# Patient Record
Sex: FEMALE | Race: WHITE | Employment: OTHER | URBAN - NONMETROPOLITAN AREA
[De-identification: names, ages, dates, MRNs, and addresses within clinical notes are randomized per-mention and may not be internally consistent; named-entity substitution may affect disease eponyms.]

---

## 2018-02-09 ENCOUNTER — HOSPITAL ENCOUNTER (EMERGENCY)
Age: 33
Discharge: HOME | End: 2018-02-09
Payer: COMMERCIAL

## 2018-02-09 VITALS
HEIGHT: 65 IN | BODY MASS INDEX: 21.66 KG/M2 | HEART RATE: 78 BPM | SYSTOLIC BLOOD PRESSURE: 118 MMHG | TEMPERATURE: 97.9 F | OXYGEN SATURATION: 100 % | DIASTOLIC BLOOD PRESSURE: 74 MMHG | WEIGHT: 130 LBS | RESPIRATION RATE: 16 BRPM

## 2018-02-09 DIAGNOSIS — J10.1 INFLUENZA A: Primary | ICD-10-CM

## 2018-02-09 LAB
FLU A ANTIGEN: POSITIVE
FLU B ANTIGEN: NEGATIVE
GROUP A STREP CULTURE, REFLEX: NEGATIVE
REFLEX THROAT C + S: NORMAL

## 2018-02-09 PROCEDURE — 87804 INFLUENZA ASSAY W/OPTIC: CPT

## 2018-02-09 PROCEDURE — 87880 STREP A ASSAY W/OPTIC: CPT

## 2018-02-09 PROCEDURE — 99214 OFFICE O/P EST MOD 30 MIN: CPT

## 2018-02-09 PROCEDURE — 99202 OFFICE O/P NEW SF 15 MIN: CPT | Performed by: NURSE PRACTITIONER

## 2018-02-09 PROCEDURE — 87070 CULTURE OTHR SPECIMN AEROBIC: CPT

## 2018-02-09 ASSESSMENT — ENCOUNTER SYMPTOMS
RHINORRHEA: 0
SINUS PRESSURE: 0
NAUSEA: 0
DIARRHEA: 0
VOMITING: 0
ABDOMINAL PAIN: 0
SORE THROAT: 1
SHORTNESS OF BREATH: 0
CHEST TIGHTNESS: 0

## 2018-02-09 ASSESSMENT — PAIN SCALES - GENERAL: PAINLEVEL_OUTOF10: 4

## 2018-02-09 ASSESSMENT — PAIN DESCRIPTION - DESCRIPTORS: DESCRIPTORS: BURNING

## 2018-02-09 ASSESSMENT — PAIN DESCRIPTION - PROGRESSION: CLINICAL_PROGRESSION: NOT CHANGED

## 2018-02-09 ASSESSMENT — PAIN DESCRIPTION - ONSET: ONSET: ON-GOING

## 2018-02-09 ASSESSMENT — PAIN DESCRIPTION - LOCATION: LOCATION: THROAT

## 2018-02-09 ASSESSMENT — PAIN DESCRIPTION - PAIN TYPE: TYPE: ACUTE PAIN

## 2018-02-09 ASSESSMENT — PAIN DESCRIPTION - FREQUENCY: FREQUENCY: CONTINUOUS

## 2018-02-09 NOTE — ED PROVIDER NOTES
Neurological: She is alert and oriented to person, place, and time. Skin: Skin is warm and dry. No rash (on exposed surfaces) noted. She is not diaphoretic. Psychiatric: She has a normal mood and affect. Her speech is normal.   Nursing note and vitals reviewed. DIAGNOSTIC RESULTS   Labs:  Results for orders placed or performed during the hospital encounter of 02/09/18   Rapid influenza A/B antigens   Result Value Ref Range    Flu A Antigen POSITIVE (A) NEGATIVE    Flu B Antigen NEGATIVE NEGATIVE   Group A Strep, Reflex   Result Value Ref Range    GROUP A STREP CULTURE, REFLEX NEGATIVE     REFLEX THROAT C + S INDICATED        IMAGING:  No orders to display     URGENT CARE COURSE:     Vitals:    02/09/18 1034   BP: 118/74   Pulse: 78   Resp: 16   Temp: 97.9 °F (36.6 °C)   TempSrc: Oral   SpO2: 100%   Weight: 130 lb (59 kg)   Height: 5' 5\" (1.651 m)       Medications - No data to display  PROCEDURES:  None  FINAL IMPRESSION      1. Influenza A        DISPOSITION/PLAN   DISPOSITION    Discharge  Testing results reviewed with patient. She reports that she is actively trying to conceive. Tamiflu was contraindicated for this reason. Medications as directed. Differential diagnosis(s) discussed with patient/patient representative. Home care/self care instructions reviewed with patient/patient representative. Patient is to follow-up with family care provider in 2-3 days if no improvement. Patient is to go to the emergency department if symptoms worsen. Patient/patient representative is aware of care plan, questions answered, verbalizes understanding and is in agreement. Teach back method used for patient/patient representative teaching(s) and printed instructions attached to after visit summary.     PATIENT REFERRED TO:  Arianna Vo NP  [address] [facility], [address] [address]  [address] [address] [address]    Schedule an appointment as soon as possible for a visit in 1 week  for further evalation, As needed, If

## 2018-02-11 LAB — THROAT/NOSE CULTURE: NORMAL

## 2019-03-01 ENCOUNTER — PREP FOR PROCEDURE (OUTPATIENT)
Dept: OBGYN | Age: 34
End: 2019-03-01

## 2019-03-01 ENCOUNTER — HOSPITAL ENCOUNTER (OUTPATIENT)
Age: 34
Discharge: HOME | End: 2019-03-01
Payer: COMMERCIAL

## 2019-03-01 LAB
ABO: NORMAL
BASOPHILS # BLD: 0.4 %
BASOPHILS ABSOLUTE: 0 THOU/MM3 (ref 0–0.1)
EOSINOPHIL # BLD: 0.4 %
EOSINOPHILS ABSOLUTE: 0 THOU/MM3 (ref 0–0.4)
ERYTHROCYTE [DISTWIDTH] IN BLOOD BY AUTOMATED COUNT: 13.2 % (ref 11.5–14.5)
ERYTHROCYTE [DISTWIDTH] IN BLOOD BY AUTOMATED COUNT: 43.8 FL (ref 35–45)
HCT VFR BLD CALC: 41.8 % (ref 37–47)
HEMOGLOBIN: 13.5 GM/DL (ref 12–16)
IMMATURE GRANS (ABS): 0.03 THOU/MM3 (ref 0–0.07)
IMMATURE GRANULOCYTES: 0.4 %
LYMPHOCYTES # BLD: 17.1 %
LYMPHOCYTES ABSOLUTE: 1.1 THOU/MM3 (ref 1–4.8)
MCH RBC QN AUTO: 29.3 PG (ref 26–33)
MCHC RBC AUTO-ENTMCNC: 32.3 GM/DL (ref 32.2–35.5)
MCV RBC AUTO: 90.7 FL (ref 81–99)
MONOCYTES # BLD: 7.4 %
MONOCYTES ABSOLUTE: 0.5 THOU/MM3 (ref 0.4–1.3)
NUCLEATED RED BLOOD CELLS: 0 /100 WBC
PLATELET # BLD: 212 THOU/MM3 (ref 130–400)
PMV BLD AUTO: 11.1 FL (ref 9.4–12.4)
RBC # BLD: 4.61 MILL/MM3 (ref 4.2–5.4)
RH FACTOR: NORMAL
SEG NEUTROPHILS: 74.3 %
SEGMENTED NEUTROPHILS ABSOLUTE COUNT: 5 THOU/MM3 (ref 1.8–7.7)
WBC # BLD: 6.7 THOU/MM3 (ref 4.8–10.8)

## 2019-03-01 PROCEDURE — 86901 BLOOD TYPING SEROLOGIC RH(D): CPT

## 2019-03-01 PROCEDURE — 86900 BLOOD TYPING SEROLOGIC ABO: CPT

## 2019-03-01 PROCEDURE — 36415 COLL VENOUS BLD VENIPUNCTURE: CPT

## 2019-03-01 PROCEDURE — 85025 COMPLETE CBC W/AUTO DIFF WBC: CPT

## 2019-03-01 RX ORDER — DOXYCYCLINE HYCLATE 100 MG
100 TABLET ORAL ONCE
Status: CANCELLED | OUTPATIENT
Start: 2019-03-01 | End: 2019-03-01

## 2019-03-01 RX ORDER — SODIUM CHLORIDE, SODIUM LACTATE, POTASSIUM CHLORIDE, CALCIUM CHLORIDE 600; 310; 30; 20 MG/100ML; MG/100ML; MG/100ML; MG/100ML
INJECTION, SOLUTION INTRAVENOUS CONTINUOUS
Status: CANCELLED | OUTPATIENT
Start: 2019-03-01

## 2019-03-01 RX ORDER — KETOROLAC TROMETHAMINE 30 MG/ML
30 INJECTION, SOLUTION INTRAMUSCULAR; INTRAVENOUS ONCE
Status: CANCELLED | OUTPATIENT
Start: 2019-03-01 | End: 2019-03-01

## 2019-03-01 RX ORDER — ONDANSETRON 2 MG/ML
8 INJECTION INTRAMUSCULAR; INTRAVENOUS EVERY 8 HOURS PRN
Status: CANCELLED | OUTPATIENT
Start: 2019-03-01

## 2019-03-04 ENCOUNTER — ANESTHESIA EVENT (OUTPATIENT)
Dept: OPERATING ROOM | Age: 34
End: 2019-03-04
Payer: COMMERCIAL

## 2019-03-04 ENCOUNTER — ANESTHESIA (OUTPATIENT)
Dept: OPERATING ROOM | Age: 34
End: 2019-03-04
Payer: COMMERCIAL

## 2019-03-04 ENCOUNTER — HOSPITAL ENCOUNTER (OUTPATIENT)
Age: 34
Setting detail: OUTPATIENT SURGERY
Discharge: HOME | End: 2019-03-04
Attending: OBSTETRICS & GYNECOLOGY | Admitting: OBSTETRICS & GYNECOLOGY
Payer: COMMERCIAL

## 2019-03-04 VITALS
DIASTOLIC BLOOD PRESSURE: 70 MMHG | OXYGEN SATURATION: 99 % | SYSTOLIC BLOOD PRESSURE: 107 MMHG | RESPIRATION RATE: 3 BRPM | TEMPERATURE: 98.6 F

## 2019-03-04 VITALS
HEIGHT: 65 IN | TEMPERATURE: 97.4 F | RESPIRATION RATE: 12 BRPM | HEART RATE: 61 BPM | BODY MASS INDEX: 22.33 KG/M2 | OXYGEN SATURATION: 98 % | DIASTOLIC BLOOD PRESSURE: 73 MMHG | SYSTOLIC BLOOD PRESSURE: 106 MMHG | WEIGHT: 134 LBS

## 2019-03-04 DIAGNOSIS — O02.1 MISSED AB: Primary | ICD-10-CM

## 2019-03-04 PROCEDURE — 6360000002 HC RX W HCPCS: Performed by: REGISTERED NURSE

## 2019-03-04 PROCEDURE — 88305 TISSUE EXAM BY PATHOLOGIST: CPT

## 2019-03-04 PROCEDURE — 3700000001 HC ADD 15 MINUTES (ANESTHESIA): Performed by: OBSTETRICS & GYNECOLOGY

## 2019-03-04 PROCEDURE — 2580000003 HC RX 258: Performed by: REGISTERED NURSE

## 2019-03-04 PROCEDURE — 7100000000 HC PACU RECOVERY - FIRST 15 MIN: Performed by: OBSTETRICS & GYNECOLOGY

## 2019-03-04 PROCEDURE — 3600000003 HC SURGERY LEVEL 3 BASE: Performed by: OBSTETRICS & GYNECOLOGY

## 2019-03-04 PROCEDURE — 7100000010 HC PHASE II RECOVERY - FIRST 15 MIN: Performed by: OBSTETRICS & GYNECOLOGY

## 2019-03-04 PROCEDURE — 7100000001 HC PACU RECOVERY - ADDTL 15 MIN: Performed by: OBSTETRICS & GYNECOLOGY

## 2019-03-04 PROCEDURE — 7100000011 HC PHASE II RECOVERY - ADDTL 15 MIN: Performed by: OBSTETRICS & GYNECOLOGY

## 2019-03-04 PROCEDURE — 2709999900 HC NON-CHARGEABLE SUPPLY: Performed by: OBSTETRICS & GYNECOLOGY

## 2019-03-04 PROCEDURE — 3600000013 HC SURGERY LEVEL 3 ADDTL 15MIN: Performed by: OBSTETRICS & GYNECOLOGY

## 2019-03-04 PROCEDURE — 6370000000 HC RX 637 (ALT 250 FOR IP): Performed by: OBSTETRICS & GYNECOLOGY

## 2019-03-04 PROCEDURE — 3700000000 HC ANESTHESIA ATTENDED CARE: Performed by: OBSTETRICS & GYNECOLOGY

## 2019-03-04 RX ORDER — PROPOFOL 10 MG/ML
INJECTION, EMULSION INTRAVENOUS PRN
Status: DISCONTINUED | OUTPATIENT
Start: 2019-03-04 | End: 2019-03-04 | Stop reason: SDUPTHER

## 2019-03-04 RX ORDER — DIPHENHYDRAMINE HYDROCHLORIDE 50 MG/ML
12.5 INJECTION INTRAMUSCULAR; INTRAVENOUS
Status: DISCONTINUED | OUTPATIENT
Start: 2019-03-04 | End: 2019-03-04 | Stop reason: HOSPADM

## 2019-03-04 RX ORDER — ONDANSETRON 2 MG/ML
INJECTION INTRAMUSCULAR; INTRAVENOUS PRN
Status: DISCONTINUED | OUTPATIENT
Start: 2019-03-04 | End: 2019-03-04 | Stop reason: SDUPTHER

## 2019-03-04 RX ORDER — ONDANSETRON 2 MG/ML
8 INJECTION INTRAMUSCULAR; INTRAVENOUS EVERY 8 HOURS PRN
Status: CANCELLED | OUTPATIENT
Start: 2019-03-04

## 2019-03-04 RX ORDER — ONDANSETRON 2 MG/ML
8 INJECTION INTRAMUSCULAR; INTRAVENOUS EVERY 8 HOURS PRN
Status: DISCONTINUED | OUTPATIENT
Start: 2019-03-04 | End: 2019-03-04 | Stop reason: HOSPADM

## 2019-03-04 RX ORDER — SODIUM CHLORIDE, SODIUM LACTATE, POTASSIUM CHLORIDE, CALCIUM CHLORIDE 600; 310; 30; 20 MG/100ML; MG/100ML; MG/100ML; MG/100ML
INJECTION, SOLUTION INTRAVENOUS CONTINUOUS
Status: DISCONTINUED | OUTPATIENT
Start: 2019-03-04 | End: 2019-03-04 | Stop reason: HOSPADM

## 2019-03-04 RX ORDER — SODIUM CHLORIDE, SODIUM LACTATE, POTASSIUM CHLORIDE, CALCIUM CHLORIDE 600; 310; 30; 20 MG/100ML; MG/100ML; MG/100ML; MG/100ML
INJECTION, SOLUTION INTRAVENOUS SEE ADMIN INSTRUCTIONS
Status: CANCELLED | OUTPATIENT
Start: 2019-03-04

## 2019-03-04 RX ORDER — HYDROCODONE BITARTRATE AND ACETAMINOPHEN 5; 325 MG/1; MG/1
1 TABLET ORAL EVERY 4 HOURS PRN
Status: CANCELLED | OUTPATIENT
Start: 2019-03-04

## 2019-03-04 RX ORDER — DOXYCYCLINE HYCLATE 100 MG
200 TABLET ORAL ONCE
Status: CANCELLED | OUTPATIENT
Start: 2019-03-04 | End: 2019-03-04

## 2019-03-04 RX ORDER — DIPHENHYDRAMINE HYDROCHLORIDE 50 MG/ML
INJECTION INTRAMUSCULAR; INTRAVENOUS PRN
Status: DISCONTINUED | OUTPATIENT
Start: 2019-03-04 | End: 2019-03-04 | Stop reason: SDUPTHER

## 2019-03-04 RX ORDER — FENTANYL CITRATE 50 UG/ML
25 INJECTION, SOLUTION INTRAMUSCULAR; INTRAVENOUS EVERY 5 MIN PRN
Status: DISCONTINUED | OUTPATIENT
Start: 2019-03-04 | End: 2019-03-04 | Stop reason: HOSPADM

## 2019-03-04 RX ORDER — HYDROCODONE BITARTRATE AND ACETAMINOPHEN 5; 325 MG/1; MG/1
2 TABLET ORAL EVERY 4 HOURS PRN
Status: CANCELLED | OUTPATIENT
Start: 2019-03-04

## 2019-03-04 RX ORDER — KETOROLAC TROMETHAMINE 30 MG/ML
30 INJECTION, SOLUTION INTRAMUSCULAR; INTRAVENOUS ONCE
Status: DISCONTINUED | OUTPATIENT
Start: 2019-03-04 | End: 2019-03-04 | Stop reason: HOSPADM

## 2019-03-04 RX ORDER — HYDROCODONE BITARTRATE AND ACETAMINOPHEN 5; 325 MG/1; MG/1
1 TABLET ORAL EVERY 6 HOURS PRN
Qty: 20 TABLET | Refills: 0 | Status: SHIPPED | OUTPATIENT
Start: 2019-03-04 | End: 2019-03-11

## 2019-03-04 RX ORDER — MEPERIDINE HYDROCHLORIDE 25 MG/ML
12.5 INJECTION INTRAMUSCULAR; INTRAVENOUS; SUBCUTANEOUS EVERY 5 MIN PRN
Status: DISCONTINUED | OUTPATIENT
Start: 2019-03-04 | End: 2019-03-04 | Stop reason: HOSPADM

## 2019-03-04 RX ORDER — DOCUSATE SODIUM 100 MG/1
100 CAPSULE, LIQUID FILLED ORAL 2 TIMES DAILY
Status: CANCELLED | OUTPATIENT
Start: 2019-03-04

## 2019-03-04 RX ORDER — DOXYCYCLINE HYCLATE 100 MG
100 TABLET ORAL ONCE
Status: COMPLETED | OUTPATIENT
Start: 2019-03-04 | End: 2019-03-04

## 2019-03-04 RX ORDER — MIDAZOLAM HYDROCHLORIDE 1 MG/ML
INJECTION INTRAMUSCULAR; INTRAVENOUS PRN
Status: DISCONTINUED | OUTPATIENT
Start: 2019-03-04 | End: 2019-03-04 | Stop reason: SDUPTHER

## 2019-03-04 RX ORDER — PROMETHAZINE HYDROCHLORIDE 25 MG/ML
25 INJECTION, SOLUTION INTRAMUSCULAR; INTRAVENOUS
Status: DISCONTINUED | OUTPATIENT
Start: 2019-03-04 | End: 2019-03-04 | Stop reason: HOSPADM

## 2019-03-04 RX ORDER — FENTANYL CITRATE 50 UG/ML
50 INJECTION, SOLUTION INTRAMUSCULAR; INTRAVENOUS EVERY 5 MIN PRN
Status: DISCONTINUED | OUTPATIENT
Start: 2019-03-04 | End: 2019-03-04 | Stop reason: HOSPADM

## 2019-03-04 RX ORDER — ACETAMINOPHEN 325 MG/1
650 TABLET ORAL EVERY 4 HOURS PRN
Status: CANCELLED | OUTPATIENT
Start: 2019-03-04

## 2019-03-04 RX ORDER — MORPHINE SULFATE 2 MG/ML
4 INJECTION, SOLUTION INTRAMUSCULAR; INTRAVENOUS
Status: CANCELLED | OUTPATIENT
Start: 2019-03-04

## 2019-03-04 RX ORDER — DEXAMETHASONE SODIUM PHOSPHATE 4 MG/ML
INJECTION, SOLUTION INTRA-ARTICULAR; INTRALESIONAL; INTRAMUSCULAR; INTRAVENOUS; SOFT TISSUE PRN
Status: DISCONTINUED | OUTPATIENT
Start: 2019-03-04 | End: 2019-03-04 | Stop reason: SDUPTHER

## 2019-03-04 RX ORDER — MORPHINE SULFATE 2 MG/ML
2 INJECTION, SOLUTION INTRAMUSCULAR; INTRAVENOUS
Status: CANCELLED | OUTPATIENT
Start: 2019-03-04

## 2019-03-04 RX ORDER — METHYLERGONOVINE MALEATE 0.2 MG/ML
INJECTION INTRAVENOUS PRN
Status: DISCONTINUED | OUTPATIENT
Start: 2019-03-04 | End: 2019-03-04 | Stop reason: SDUPTHER

## 2019-03-04 RX ORDER — KETOROLAC TROMETHAMINE 30 MG/ML
INJECTION, SOLUTION INTRAMUSCULAR; INTRAVENOUS PRN
Status: DISCONTINUED | OUTPATIENT
Start: 2019-03-04 | End: 2019-03-04 | Stop reason: SDUPTHER

## 2019-03-04 RX ORDER — FENTANYL CITRATE 50 UG/ML
INJECTION, SOLUTION INTRAMUSCULAR; INTRAVENOUS PRN
Status: DISCONTINUED | OUTPATIENT
Start: 2019-03-04 | End: 2019-03-04 | Stop reason: SDUPTHER

## 2019-03-04 RX ORDER — SODIUM CHLORIDE 9 MG/ML
INJECTION, SOLUTION INTRAVENOUS CONTINUOUS PRN
Status: DISCONTINUED | OUTPATIENT
Start: 2019-03-04 | End: 2019-03-04 | Stop reason: SDUPTHER

## 2019-03-04 RX ADMIN — DEXAMETHASONE SODIUM PHOSPHATE 4 MG: 4 INJECTION, SOLUTION INTRAMUSCULAR; INTRAVENOUS at 11:45

## 2019-03-04 RX ADMIN — KETOROLAC TROMETHAMINE 30 MG: 30 INJECTION, SOLUTION INTRAMUSCULAR; INTRAVENOUS at 11:54

## 2019-03-04 RX ADMIN — SODIUM CHLORIDE: 9 INJECTION, SOLUTION INTRAVENOUS at 11:40

## 2019-03-04 RX ADMIN — FENTANYL CITRATE 25 MCG: 50 INJECTION INTRAMUSCULAR; INTRAVENOUS at 11:48

## 2019-03-04 RX ADMIN — ONDANSETRON HYDROCHLORIDE 4 MG: 4 INJECTION, SOLUTION INTRAMUSCULAR; INTRAVENOUS at 11:45

## 2019-03-04 RX ADMIN — MIDAZOLAM HYDROCHLORIDE 2 MG: 1 INJECTION, SOLUTION INTRAMUSCULAR; INTRAVENOUS at 11:40

## 2019-03-04 RX ADMIN — METHYLERGONOVINE MALEATE 200 MCG: 0.2 INJECTION, SOLUTION INTRAMUSCULAR; INTRAVENOUS at 11:52

## 2019-03-04 RX ADMIN — DIPHENHYDRAMINE HYDROCHLORIDE 12.5 MG: 50 INJECTION, SOLUTION INTRAMUSCULAR; INTRAVENOUS at 11:45

## 2019-03-04 RX ADMIN — FENTANYL CITRATE 25 MCG: 50 INJECTION INTRAMUSCULAR; INTRAVENOUS at 11:50

## 2019-03-04 RX ADMIN — PROPOFOL 150 MG: 10 INJECTION, EMULSION INTRAVENOUS at 11:43

## 2019-03-04 RX ADMIN — DOXYCYCLINE HYCLATE 100 MG: 100 TABLET, COATED ORAL at 11:22

## 2019-03-04 ASSESSMENT — PULMONARY FUNCTION TESTS
PIF_VALUE: 3
PIF_VALUE: 16
PIF_VALUE: 5
PIF_VALUE: 15
PIF_VALUE: 12
PIF_VALUE: 11
PIF_VALUE: 2
PIF_VALUE: 2
PIF_VALUE: 7
PIF_VALUE: 4
PIF_VALUE: 1
PIF_VALUE: 2

## 2019-03-04 ASSESSMENT — PAIN - FUNCTIONAL ASSESSMENT: PAIN_FUNCTIONAL_ASSESSMENT: 0-10

## 2019-03-04 ASSESSMENT — LIFESTYLE VARIABLES: SMOKING_STATUS: 0

## 2019-08-01 ENCOUNTER — HOSPITAL ENCOUNTER (OUTPATIENT)
Dept: WOMENS IMAGING | Age: 34
Discharge: HOME | End: 2019-08-01
Payer: COMMERCIAL

## 2019-08-01 DIAGNOSIS — N64.4 BREAST PAIN: ICD-10-CM

## 2019-08-01 PROCEDURE — 76642 ULTRASOUND BREAST LIMITED: CPT

## 2019-08-01 PROCEDURE — G0279 TOMOSYNTHESIS, MAMMO: HCPCS

## 2020-06-16 ENCOUNTER — APPOINTMENT (OUTPATIENT)
Dept: LABOR AND DELIVERY | Age: 35
End: 2020-06-16
Payer: COMMERCIAL

## 2020-06-16 ENCOUNTER — ANESTHESIA (OUTPATIENT)
Dept: LABOR AND DELIVERY | Age: 35
End: 2020-06-16
Payer: COMMERCIAL

## 2020-06-16 ENCOUNTER — HOSPITAL ENCOUNTER (INPATIENT)
Age: 35
LOS: 1 days | Discharge: HOME | End: 2020-06-17
Attending: OBSTETRICS & GYNECOLOGY | Admitting: OBSTETRICS & GYNECOLOGY
Payer: COMMERCIAL

## 2020-06-16 ENCOUNTER — ANESTHESIA EVENT (OUTPATIENT)
Dept: LABOR AND DELIVERY | Age: 35
End: 2020-06-16
Payer: COMMERCIAL

## 2020-06-16 LAB
ABO: NORMAL
AMPHETAMINE+METHAMPHETAMINE URINE SCREEN: NEGATIVE
ANTIBODY SCREEN: NORMAL
BARBITURATE QUANTITATIVE URINE: NEGATIVE
BASOPHILS # BLD: 0.2 %
BASOPHILS ABSOLUTE: 0 THOU/MM3 (ref 0–0.1)
BENZODIAZEPINE QUANTITATIVE URINE: NEGATIVE
CANNABINOID QUANTITATIVE URINE: NEGATIVE
COCAINE METABOLITE QUANTITATIVE URINE: NEGATIVE
EOSINOPHIL # BLD: 0.4 %
EOSINOPHILS ABSOLUTE: 0 THOU/MM3 (ref 0–0.4)
ERYTHROCYTE [DISTWIDTH] IN BLOOD BY AUTOMATED COUNT: 13.5 % (ref 11.5–14.5)
ERYTHROCYTE [DISTWIDTH] IN BLOOD BY AUTOMATED COUNT: 46.9 FL (ref 35–45)
HCT VFR BLD CALC: 39.2 % (ref 37–47)
HEMOGLOBIN: 12 GM/DL (ref 12–16)
IMMATURE GRANS (ABS): 0.08 THOU/MM3 (ref 0–0.07)
IMMATURE GRANULOCYTES: 0.9 %
LYMPHOCYTES # BLD: 16.1 %
LYMPHOCYTES ABSOLUTE: 1.4 THOU/MM3 (ref 1–4.8)
MCH RBC QN AUTO: 29.1 PG (ref 26–33)
MCHC RBC AUTO-ENTMCNC: 30.6 GM/DL (ref 32.2–35.5)
MCV RBC AUTO: 94.9 FL (ref 81–99)
MONOCYTES # BLD: 7.1 %
MONOCYTES ABSOLUTE: 0.6 THOU/MM3 (ref 0.4–1.3)
NUCLEATED RED BLOOD CELLS: 0 /100 WBC
OPIATES, URINE: NEGATIVE
OXYCODONE: NEGATIVE
PHENCYCLIDINE QUANTITATIVE URINE: NEGATIVE
PLATELET # BLD: 171 THOU/MM3 (ref 130–400)
PMV BLD AUTO: 12 FL (ref 9.4–12.4)
RBC # BLD: 4.13 MILL/MM3 (ref 4.2–5.4)
RH FACTOR: NORMAL
RPR: NONREACTIVE
SEG NEUTROPHILS: 75.3 %
SEGMENTED NEUTROPHILS ABSOLUTE COUNT: 6.5 THOU/MM3 (ref 1.8–7.7)
WBC # BLD: 8.6 THOU/MM3 (ref 4.8–10.8)

## 2020-06-16 PROCEDURE — 86901 BLOOD TYPING SEROLOGIC RH(D): CPT

## 2020-06-16 PROCEDURE — 6370000000 HC RX 637 (ALT 250 FOR IP): Performed by: OBSTETRICS & GYNECOLOGY

## 2020-06-16 PROCEDURE — 2500000003 HC RX 250 WO HCPCS: Performed by: ANESTHESIOLOGY

## 2020-06-16 PROCEDURE — 2709999900 HC NON-CHARGEABLE SUPPLY

## 2020-06-16 PROCEDURE — 7200000001 HC VAGINAL DELIVERY

## 2020-06-16 PROCEDURE — 86850 RBC ANTIBODY SCREEN: CPT

## 2020-06-16 PROCEDURE — 85025 COMPLETE CBC W/AUTO DIFF WBC: CPT

## 2020-06-16 PROCEDURE — 1200000000 HC SEMI PRIVATE

## 2020-06-16 PROCEDURE — 86900 BLOOD TYPING SEROLOGIC ABO: CPT

## 2020-06-16 PROCEDURE — 2580000003 HC RX 258: Performed by: OBSTETRICS & GYNECOLOGY

## 2020-06-16 PROCEDURE — 0KQM0ZZ REPAIR PERINEUM MUSCLE, OPEN APPROACH: ICD-10-PCS | Performed by: OBSTETRICS & GYNECOLOGY

## 2020-06-16 PROCEDURE — 6360000002 HC RX W HCPCS

## 2020-06-16 PROCEDURE — 86592 SYPHILIS TEST NON-TREP QUAL: CPT

## 2020-06-16 PROCEDURE — 3700000025 EPIDURAL BLOCK: Performed by: ANESTHESIOLOGY

## 2020-06-16 PROCEDURE — 80307 DRUG TEST PRSMV CHEM ANLYZR: CPT

## 2020-06-16 PROCEDURE — 36415 COLL VENOUS BLD VENIPUNCTURE: CPT

## 2020-06-16 RX ORDER — NALOXONE HYDROCHLORIDE 0.4 MG/ML
0.4 INJECTION, SOLUTION INTRAMUSCULAR; INTRAVENOUS; SUBCUTANEOUS PRN
Status: DISCONTINUED | OUTPATIENT
Start: 2020-06-16 | End: 2020-06-16

## 2020-06-16 RX ORDER — MORPHINE SULFATE 4 MG/ML
4 INJECTION, SOLUTION INTRAMUSCULAR; INTRAVENOUS
Status: DISCONTINUED | OUTPATIENT
Start: 2020-06-16 | End: 2020-06-16

## 2020-06-16 RX ORDER — IBUPROFEN 800 MG/1
800 TABLET ORAL 3 TIMES DAILY
Status: DISCONTINUED | OUTPATIENT
Start: 2020-06-16 | End: 2020-06-17 | Stop reason: HOSPADM

## 2020-06-16 RX ORDER — NALBUPHINE HCL 10 MG/ML
5 AMPUL (ML) INJECTION EVERY 4 HOURS PRN
Status: DISCONTINUED | OUTPATIENT
Start: 2020-06-16 | End: 2020-06-16

## 2020-06-16 RX ORDER — MISOPROSTOL 200 UG/1
800 TABLET ORAL
Status: ACTIVE | OUTPATIENT
Start: 2020-06-16 | End: 2020-06-16

## 2020-06-16 RX ORDER — METHYLERGONOVINE MALEATE 0.2 MG/ML
200 INJECTION INTRAVENOUS
Status: ACTIVE | OUTPATIENT
Start: 2020-06-16 | End: 2020-06-16

## 2020-06-16 RX ORDER — SODIUM CHLORIDE 0.9 % (FLUSH) 0.9 %
10 SYRINGE (ML) INJECTION EVERY 12 HOURS SCHEDULED
Status: DISCONTINUED | OUTPATIENT
Start: 2020-06-16 | End: 2020-06-17 | Stop reason: HOSPADM

## 2020-06-16 RX ORDER — DIPHENHYDRAMINE HYDROCHLORIDE 50 MG/ML
25 INJECTION INTRAMUSCULAR; INTRAVENOUS EVERY 4 HOURS PRN
Status: DISCONTINUED | OUTPATIENT
Start: 2020-06-16 | End: 2020-06-16

## 2020-06-16 RX ORDER — MORPHINE SULFATE 2 MG/ML
2 INJECTION, SOLUTION INTRAMUSCULAR; INTRAVENOUS
Status: DISCONTINUED | OUTPATIENT
Start: 2020-06-16 | End: 2020-06-16

## 2020-06-16 RX ORDER — SODIUM CHLORIDE 0.9 % (FLUSH) 0.9 %
10 SYRINGE (ML) INJECTION PRN
Status: DISCONTINUED | OUTPATIENT
Start: 2020-06-16 | End: 2020-06-17 | Stop reason: HOSPADM

## 2020-06-16 RX ORDER — CARBOPROST TROMETHAMINE 250 UG/ML
250 INJECTION, SOLUTION INTRAMUSCULAR PRN
Status: DISCONTINUED | OUTPATIENT
Start: 2020-06-16 | End: 2020-06-17 | Stop reason: HOSPADM

## 2020-06-16 RX ORDER — BUTORPHANOL TARTRATE 1 MG/ML
1 INJECTION, SOLUTION INTRAMUSCULAR; INTRAVENOUS
Status: DISCONTINUED | OUTPATIENT
Start: 2020-06-16 | End: 2020-06-16

## 2020-06-16 RX ORDER — MORPHINE SULFATE 2 MG/ML
2 INJECTION, SOLUTION INTRAMUSCULAR; INTRAVENOUS
Status: DISCONTINUED | OUTPATIENT
Start: 2020-06-16 | End: 2020-06-17 | Stop reason: HOSPADM

## 2020-06-16 RX ORDER — MISOPROSTOL 200 UG/1
1000 TABLET ORAL PRN
Status: DISCONTINUED | OUTPATIENT
Start: 2020-06-16 | End: 2020-06-16

## 2020-06-16 RX ORDER — LIDOCAINE HYDROCHLORIDE 10 MG/ML
30 INJECTION, SOLUTION EPIDURAL; INFILTRATION; INTRACAUDAL; PERINEURAL PRN
Status: DISCONTINUED | OUTPATIENT
Start: 2020-06-16 | End: 2020-06-16

## 2020-06-16 RX ORDER — SODIUM CHLORIDE, SODIUM LACTATE, POTASSIUM CHLORIDE, CALCIUM CHLORIDE 600; 310; 30; 20 MG/100ML; MG/100ML; MG/100ML; MG/100ML
INJECTION, SOLUTION INTRAVENOUS CONTINUOUS
Status: DISCONTINUED | OUTPATIENT
Start: 2020-06-16 | End: 2020-06-16

## 2020-06-16 RX ORDER — ONDANSETRON 2 MG/ML
8 INJECTION INTRAMUSCULAR; INTRAVENOUS EVERY 6 HOURS PRN
Status: DISCONTINUED | OUTPATIENT
Start: 2020-06-16 | End: 2020-06-16

## 2020-06-16 RX ORDER — ONDANSETRON 2 MG/ML
4 INJECTION INTRAMUSCULAR; INTRAVENOUS EVERY 6 HOURS PRN
Status: DISCONTINUED | OUTPATIENT
Start: 2020-06-16 | End: 2020-06-17 | Stop reason: HOSPADM

## 2020-06-16 RX ORDER — METHYLERGONOVINE MALEATE 0.2 MG/ML
200 INJECTION INTRAVENOUS PRN
Status: DISCONTINUED | OUTPATIENT
Start: 2020-06-16 | End: 2020-06-16

## 2020-06-16 RX ORDER — ONDANSETRON 2 MG/ML
4 INJECTION INTRAMUSCULAR; INTRAVENOUS EVERY 6 HOURS PRN
Status: DISCONTINUED | OUTPATIENT
Start: 2020-06-16 | End: 2020-06-16

## 2020-06-16 RX ORDER — CARBOPROST TROMETHAMINE 250 UG/ML
250 INJECTION, SOLUTION INTRAMUSCULAR
Status: DISPENSED | OUTPATIENT
Start: 2020-06-16 | End: 2020-06-16

## 2020-06-16 RX ORDER — SEVOFLURANE 250 ML/250ML
1 LIQUID RESPIRATORY (INHALATION) CONTINUOUS PRN
Status: DISCONTINUED | OUTPATIENT
Start: 2020-06-16 | End: 2020-06-16

## 2020-06-16 RX ORDER — SODIUM CHLORIDE, SODIUM LACTATE, POTASSIUM CHLORIDE, CALCIUM CHLORIDE 600; 310; 30; 20 MG/100ML; MG/100ML; MG/100ML; MG/100ML
INJECTION, SOLUTION INTRAVENOUS CONTINUOUS
Status: DISCONTINUED | OUTPATIENT
Start: 2020-06-16 | End: 2020-06-17 | Stop reason: HOSPADM

## 2020-06-16 RX ORDER — HYDROCODONE BITARTRATE AND ACETAMINOPHEN 5; 325 MG/1; MG/1
1 TABLET ORAL EVERY 4 HOURS PRN
Status: DISCONTINUED | OUTPATIENT
Start: 2020-06-16 | End: 2020-06-17 | Stop reason: HOSPADM

## 2020-06-16 RX ORDER — MORPHINE SULFATE 4 MG/ML
4 INJECTION, SOLUTION INTRAMUSCULAR; INTRAVENOUS
Status: DISCONTINUED | OUTPATIENT
Start: 2020-06-16 | End: 2020-06-17 | Stop reason: HOSPADM

## 2020-06-16 RX ORDER — MODIFIED LANOLIN
OINTMENT (GRAM) TOPICAL PRN
Status: DISCONTINUED | OUTPATIENT
Start: 2020-06-16 | End: 2020-06-17 | Stop reason: HOSPADM

## 2020-06-16 RX ORDER — IBUPROFEN 800 MG/1
800 TABLET ORAL EVERY 8 HOURS PRN
Status: DISCONTINUED | OUTPATIENT
Start: 2020-06-16 | End: 2020-06-16

## 2020-06-16 RX ORDER — DIPHENHYDRAMINE HCL 25 MG
25 TABLET ORAL EVERY 6 HOURS PRN
Status: DISCONTINUED | OUTPATIENT
Start: 2020-06-16 | End: 2020-06-17 | Stop reason: HOSPADM

## 2020-06-16 RX ORDER — DOCUSATE SODIUM 100 MG/1
100 CAPSULE, LIQUID FILLED ORAL 2 TIMES DAILY PRN
Status: DISCONTINUED | OUTPATIENT
Start: 2020-06-16 | End: 2020-06-17 | Stop reason: HOSPADM

## 2020-06-16 RX ORDER — ZOLPIDEM TARTRATE 10 MG/1
10 TABLET ORAL NIGHTLY PRN
Status: DISCONTINUED | OUTPATIENT
Start: 2020-06-16 | End: 2020-06-17 | Stop reason: HOSPADM

## 2020-06-16 RX ORDER — TERBUTALINE SULFATE 1 MG/ML
0.25 INJECTION, SOLUTION SUBCUTANEOUS
Status: DISCONTINUED | OUTPATIENT
Start: 2020-06-16 | End: 2020-06-16

## 2020-06-16 RX ORDER — HYDROCODONE BITARTRATE AND ACETAMINOPHEN 5; 325 MG/1; MG/1
2 TABLET ORAL EVERY 4 HOURS PRN
Status: DISCONTINUED | OUTPATIENT
Start: 2020-06-16 | End: 2020-06-17 | Stop reason: HOSPADM

## 2020-06-16 RX ORDER — ACETAMINOPHEN 325 MG/1
650 TABLET ORAL EVERY 4 HOURS PRN
Status: DISCONTINUED | OUTPATIENT
Start: 2020-06-16 | End: 2020-06-17 | Stop reason: HOSPADM

## 2020-06-16 RX ORDER — ACETAMINOPHEN 325 MG/1
650 TABLET ORAL EVERY 4 HOURS PRN
Status: DISCONTINUED | OUTPATIENT
Start: 2020-06-16 | End: 2020-06-16

## 2020-06-16 RX ORDER — FERROUS SULFATE 325(65) MG
325 TABLET ORAL
Status: DISCONTINUED | OUTPATIENT
Start: 2020-06-17 | End: 2020-06-17 | Stop reason: HOSPADM

## 2020-06-16 RX ORDER — ONDANSETRON 4 MG/1
8 TABLET, FILM COATED ORAL EVERY 8 HOURS PRN
Status: DISCONTINUED | OUTPATIENT
Start: 2020-06-16 | End: 2020-06-17 | Stop reason: HOSPADM

## 2020-06-16 RX ADMIN — SODIUM CHLORIDE, POTASSIUM CHLORIDE, SODIUM LACTATE AND CALCIUM CHLORIDE: 600; 310; 30; 20 INJECTION, SOLUTION INTRAVENOUS at 05:40

## 2020-06-16 RX ADMIN — IBUPROFEN 800 MG: 800 TABLET, FILM COATED ORAL at 19:10

## 2020-06-16 RX ADMIN — SODIUM CHLORIDE, POTASSIUM CHLORIDE, SODIUM LACTATE AND CALCIUM CHLORIDE: 600; 310; 30; 20 INJECTION, SOLUTION INTRAVENOUS at 06:31

## 2020-06-16 RX ADMIN — Medication 18 ML/HR: at 07:11

## 2020-06-16 RX ADMIN — DOCUSATE SODIUM 100 MG: 100 CAPSULE, LIQUID FILLED ORAL at 21:18

## 2020-06-16 RX ADMIN — IBUPROFEN 800 MG: 800 TABLET, FILM COATED ORAL at 11:00

## 2020-06-16 RX ADMIN — DIPHENHYDRAMINE HCL 25 MG: 25 TABLET ORAL at 11:24

## 2020-06-16 RX ADMIN — ACETAMINOPHEN 650 MG: 325 TABLET ORAL at 21:18

## 2020-06-16 RX ADMIN — Medication 1 MILLI-UNITS/MIN: at 06:04

## 2020-06-16 ASSESSMENT — PAIN SCALES - GENERAL
PAINLEVEL_OUTOF10: 4
PAINLEVEL_OUTOF10: 2

## 2020-06-16 ASSESSMENT — PAIN DESCRIPTION - DESCRIPTORS: DESCRIPTORS: PRESSURE

## 2020-06-16 NOTE — H&P
[facility]  History and Physical Update    Pt Name: Nikia Webber  MRN: [id number]  YOB: 1985  Date of evaluation: 2020    [] I have examined the patient and reviewed the H&P/Consult and there are no changes to the patient or plans. [x] I have examined the patient and reviewed the H&P/Consult and have noted the following changes:   cvx -/-2  FHTs reactive  Ctx q 3  Anticipate     Discussion with the patient and/ or family for proposed care, treatment, services; benefits, risks, side effects; likelihood of achieving goals and potential problems that may occur during recuperation was had and all questions were answered. Discussion with the patient and/ or family of reasonable alternatives to the proposed care, treatment, services and the discussion of the risks, benefits, side effects related to the alternatives and the risk related to not receiving the proposed care treatment services was also had and all questions were answered. If this is for an elective surgical procedure then The patient was counseled at length about the risks of ivanna Covid-19 during their perioperative period and any recovery window from their procedure. The patient was made aware that ivanna Covid-19  may worsen their prognosis for recovering from their procedure  and lend to a higher morbidity and/or mortality risk. All material risks, benefits, and reasonable alternatives including postponing the procedure were discussed. The patient  does wish to proceed with the procedure at this time.              Angy Lloyd MD  Electronically signed 2020 at 8:44 AM

## 2020-06-16 NOTE — FLOWSHEET NOTE
Dr. Kody Patterson called unit. Updated on pt status: FHT reactive, SVE 2/70/-1, pt ivanna on her own since about 0300. Dr. Kody Patterson will be in to assess pt this morning. Continue with POC.

## 2020-06-16 NOTE — PLAN OF CARE
Problem: Anxiety:  Goal: Level of anxiety will decrease  Description: Level of anxiety will decrease  Outcome: Ongoing  Note: Will alleviate anxiety by answering all questions. Problem: Breathing Pattern - Ineffective:  Goal: Able to breathe comfortably  Description: Able to breathe comfortably  Outcome: Ongoing  Note: Respirations easy and unlabored. Problem: Fluid Volume - Imbalance:  Goal: Absence of intrapartum hemorrhage signs and symptoms  Description: Absence of intrapartum hemorrhage signs and symptoms  Outcome: Ongoing  Note: No signs or symptoms of intrapartum hemorrhage. Problem: Infection - Intrapartum Infection:  Goal: Will show no infection signs and symptoms  Description: Will show no infection signs and symptoms  Outcome: Ongoing  Note: Patient remains afebrile. Problem: Labor Process - Prolonged:  Goal: Uterine contractions within specified parameters  Description: Uterine contractions within specified parameters  Outcome: Ongoing  Note: Will remain within normal limits. Problem:  Screening:  Goal: Ability to make informed decisions regarding treatment has improved  Description: Ability to make informed decisions regarding treatment has improved  Outcome: Ongoing  Note: Patient alert and oriented x4. Problem: Pain - Acute:  Goal: Able to cope with pain  Description: Able to cope with pain  Outcome: Ongoing  Note: No c/o pain at this time. Pain goal 5/10. Problem: Tissue Perfusion - Uteroplacental, Altered:  Goal: Absence of abnormal fetal heart rate pattern  Description: Absence of abnormal fetal heart rate pattern  Outcome: Ongoing  Note: Will remain wdnl. Problem: Urinary Retention:  Goal: Urinary elimination within specified parameters  Description: Urinary elimination within specified parameters  Outcome: Ongoing  Note: Patient able to ambulate to bathroom, voiding with ease.       Problem: Discharge Planning:  Goal: Discharged to appropriate level of care  Description: Discharged to appropriate level of care  Outcome: Ongoing  Note: Discussed infant care and breast feeding. Care plan reviewed with patient and fob. Patient and fob verbalize understanding of the plan of care and contribute to goal setting.

## 2020-06-16 NOTE — DISCHARGE SUMMARY
Obstetric Discharge Summary      Pt Name: Victoria Garcia  MRN: [id number] Ofelia #: [de-identified]  YOB: 1985        Admitting Diagnosis  IUP  OB History        5    Para   2    Term   2            AB   3    Living   1       SAB   3    TAB        Ectopic        Molar        Multiple   0    Live Births   1                Reasons for Admission on 2020  5:27 AM   (spontaneous vaginal delivery) [O80]  No comment available  Vaginal Delivery      Intrapartum Procedures                          Postpartum/Operative Complications        Data  Information for the patient's :  Jeremy Viridiana Islas [[id number]]   female  Birth Weight: 6 lb 14.2 oz (3.125 kg)      Discharge Diagnosis       Discharge Information  Current Discharge Medication List      CONTINUE these medications which have NOT CHANGED    Details   Prenatal Multivit-Min-Fe-FA (PRE-LOUIS FORMULA PO) Take by mouth             No discharge procedures on file. Vaginal Delivery  Diet regular  Condition Good    Discharge to:  home  Follow up in 5-6 wks.   Jia Lloyd 2020 5:33 PM

## 2020-06-16 NOTE — ANESTHESIA PRE PROCEDURE
PRN Pilar Miller MD        methylergonovine (METHERGINE) injection 200 mcg  200 mcg Intramuscular PRN Pilar Miller MD        miSOPROStol (CYTOTEC) tablet 1,000 mcg  1,000 mcg Rectal PRN Pilar Miller MD        witch hazel-glycerin (TUCKS) pad   Topical PRN Pilar Miller MD        benzocaine-menthol (DERMOPLAST) 20-0.5 % spray   Topical PRN Pilar Miller MD        fentaNYL 750 mcg, ropivacaine 0.1% in sodium chloride 0.9% 265mL (OB) epidural  18 mL/hr Epidural Continuous Felicita Coffee Heitmeelena, DO        naloxone [facility]) injection 0.4 mg  0.4 mg Intravenous PRN Felicita Coffee Heitmeyer, DO        nalbuphine (NUBAIN) injection 5 mg  5 mg Intravenous Q4H PRN Felicita Coffee Heitmeyer, DO        ondansetron [facility]) injection 4 mg  4 mg Intravenous Q6H PRN Felicita Coffee Heitmeyer, DO           Allergies:     Allergies   Allergen Reactions    Morphine Nausea And Vomiting       Problem List:    Patient Active Problem List   Diagnosis Code    Abdominal pain, right lower quadrant R10.31    Leukocytosis D72.829    Normal delivery O80    Missed ab O02.1       Past Medical History:        Diagnosis Date    Abnormal Pap smear of cervix     age 27    Rh incompatibility     Rhogam given in 2015       Past Surgical History:        Procedure Laterality Date    APPENDECTOMY      ARM SURGERY      broke R arm as a child    DILATION AND CURETTAGE OF UTERUS  2011    DILATION AND CURETTAGE OF UTERUS N/A 3/4/2019    DILATATION AND CURETTAGE SUCTION performed by Pilar Miller MD at [address] [place] [address] OTHER SURGICAL HISTORY  4/15/2013    LAPAROSCOPIC APPENDECTOMY       Social History:    Social History     Tobacco Use    Smoking status: Never Smoker    Smokeless tobacco: Never Used   Substance Use Topics    Alcohol use: Yes     Comment: social                                Counseling given: Not Answered      Vital Signs (Current):   Vitals:    06/16/20 6988 06/16/20 0537 06/16/20 6498

## 2020-06-16 NOTE — PLAN OF CARE
decisions regarding treatment has improved  Description: Ability to make informed decisions regarding treatment has improved  6/16/2020 0807 by Lili Horton RN  Outcome: Ongoing  Note: Making own decisions with support from  Shari Escamilla. 6/16/2020 0603 by Tamara Palumbo RN  Outcome: Ongoing  Note: Patient alert and oriented x4. Problem: Pain - Acute:  Goal: Able to cope with pain  Description: Able to cope with pain  6/16/2020 0807 by Lili Horton RN  Outcome: Ongoing  Note: Denies pain and pain goal is 4 and has labor epidural for pain relief and says it is working well. 6/16/2020 0603 by Tamara Palumbo RN  Outcome: Ongoing  Note: No c/o pain at this time. Pain goal 5/10. Problem: Tissue Perfusion - Uteroplacental, Altered:  Goal: Absence of abnormal fetal heart rate pattern  Description: Absence of abnormal fetal heart rate pattern  6/16/2020 0807 by Lili Horton RN  Outcome: Ongoing  Note: Reactive fetal tracing. 6/16/2020 0603 by Tamara Palumbo RN  Outcome: Ongoing  Note: Will remain wdnl. Problem: Urinary Retention:  Goal: Urinary elimination within specified parameters  Description: Urinary elimination within specified parameters  6/16/2020 0807 by Lili Horton RN  Outcome: Ongoing  Note: Bladder is not distended and will use catheter prn for bladder distension. 6/16/2020 0603 by Tamara Palumbo RN  Outcome: Ongoing  Note: Patient able to ambulate to bathroom, voiding with ease. Problem: Discharge Planning:  Goal: Discharged to appropriate level of care  Description: Discharged to appropriate level of care  6/16/2020 0807 by Lili Horton RN  Outcome: Ongoing  Note: Plan to stay in hospital 1-2 days after delivery depending on orders of V. Stallkamp. 6/16/2020 0603 by Tamara Palumbo RN  Outcome: Ongoing  Note: Discussed infant care and breast feeding.     Care plan reviewed with patient and Shari Escamilla and they verbalize understanding of the plan of care and

## 2020-06-16 NOTE — FLOWSHEET NOTE
Pt sat up in bed and felt a warm gush. Pad assessed, moderate clear fluid with small amount bloody show noted.

## 2020-06-16 NOTE — FLOWSHEET NOTE
41w3d patient of Dr. Nasreen Vaca presents to L&D this morning for c/o contractions since about 0300 today. States they are just getting closer together and more uncomfortable. She was scheduled for induction this morning at 0730 so she just came to the unit early. Pt notes positive fetal movement, denies any vaginal bleeding or leaking of fluids. Contractions were q4-7m apart at home. Gown given to change into. Patient to bathroom to void, informed of maternal drug testing policy in place on all laboring patients. Verbal consent received, paper consent to be signed and urine to be sent.

## 2020-06-17 VITALS
RESPIRATION RATE: 16 BRPM | OXYGEN SATURATION: 99 % | TEMPERATURE: 97.4 F | WEIGHT: 161 LBS | DIASTOLIC BLOOD PRESSURE: 66 MMHG | HEART RATE: 59 BPM | BODY MASS INDEX: 27.49 KG/M2 | HEIGHT: 64 IN | SYSTOLIC BLOOD PRESSURE: 111 MMHG

## 2020-06-17 PROCEDURE — 90471 IMMUNIZATION ADMIN: CPT | Performed by: OBSTETRICS & GYNECOLOGY

## 2020-06-17 PROCEDURE — 6360000002 HC RX W HCPCS: Performed by: OBSTETRICS & GYNECOLOGY

## 2020-06-17 PROCEDURE — 6370000000 HC RX 637 (ALT 250 FOR IP): Performed by: OBSTETRICS & GYNECOLOGY

## 2020-06-17 PROCEDURE — 90715 TDAP VACCINE 7 YRS/> IM: CPT | Performed by: OBSTETRICS & GYNECOLOGY

## 2020-06-17 PROCEDURE — 2709999900 HC NON-CHARGEABLE SUPPLY

## 2020-06-17 RX ADMIN — IBUPROFEN 800 MG: 800 TABLET, FILM COATED ORAL at 15:16

## 2020-06-17 RX ADMIN — TETANUS TOXOID, REDUCED DIPHTHERIA TOXOID AND ACELLULAR PERTUSSIS VACCINE, ADSORBED 0.5 ML: 5; 2.5; 8; 8; 2.5 SUSPENSION INTRAMUSCULAR at 14:38

## 2020-06-17 RX ADMIN — IBUPROFEN 800 MG: 800 TABLET, FILM COATED ORAL at 07:20

## 2020-06-17 RX ADMIN — DOCUSATE SODIUM 100 MG: 100 CAPSULE, LIQUID FILLED ORAL at 07:20

## 2020-06-17 ASSESSMENT — PAIN SCALES - GENERAL
PAINLEVEL_OUTOF10: 6
PAINLEVEL_OUTOF10: 5

## 2020-06-17 NOTE — PROGRESS NOTES
Discharge teaching and instructions for diagnosis/procedure of postpartum vaginal delivery completed with patient using teachback method. AVS reviewed. Printed prescriptions given to patient. Patient voiced understanding regarding prescriptions, follow up appointments, and care of self at home.  Discharged ambulatory and in a wheelchair to  home with support per family

## 2021-10-05 ENCOUNTER — TELEPHONE (OUTPATIENT)
Dept: FAMILY MEDICINE CLINIC | Age: 36
End: 2021-10-05

## 2021-10-05 DIAGNOSIS — Z00.00 WELL ADULT EXAM: Primary | ICD-10-CM

## 2021-10-05 NOTE — TELEPHONE ENCOUNTER
----- Message from Akil Bella sent at 10/5/2021  3:40 PM EDT -----  Subject: Message to Provider    QUESTIONS  Information for Provider? Pt called in requesting an order to have blood   work done and to have antibodies checked as well .  ---------------------------------------------------------------------------  --------------  CALL BACK INFO  What is the best way for the office to contact you? OK to leave message on   voicemail  Preferred Call Back Phone Number? [phone]  ---------------------------------------------------------------------------  --------------  SCRIPT ANSWERS  Relationship to Patient?  Self

## 2021-10-19 LAB
ABSOLUTE BASO #: 0.1 X10E9/L (ref 0–0.2)
ABSOLUTE EOS #: 0 X10E9/L (ref 0–0.4)
ABSOLUTE LYMPH #: 1.7 X10E9/L (ref 1–3.5)
ABSOLUTE MONO #: 0.3 X10E9/L (ref 0–0.9)
ABSOLUTE NEUT #: 6.4 X10E9/L (ref 1.5–6.6)
ALBUMIN SERPL-MCNC: 4.4 G/DL (ref 3.2–5.3)
ALK PHOSPHATASE: 42 U/L (ref 39–130)
ALT SERPL-CCNC: 10 U/L (ref 0–31)
ANION GAP SERPL CALCULATED.3IONS-SCNC: 9 MMOL/L (ref 5–15)
AST SERPL-CCNC: 14 U/L (ref 0–41)
AVERAGE GLUCOSE: 97 MG/DL
BASOPHILS RELATIVE PERCENT: 0.7 %
BILIRUB SERPL-MCNC: 0.9 MG/DL (ref 0.3–1.2)
BUN BLDV-MCNC: 20 MG/DL (ref 5–23)
CALCIUM SERPL-MCNC: 9.4 MG/DL (ref 8.5–10.5)
CHLORIDE BLD-SCNC: 105 MMOL/L (ref 98–109)
CHOLESTEROL/HDL RATIO: 1.9 (ref 1–5)
CHOLESTEROL: 180 MG/DL (ref 150–200)
CO2: 25 MMOL/L (ref 22–32)
CREAT SERPL-MCNC: 0.81 MG/DL (ref 0.4–1)
DATE OF ONSET: NORMAL
EGFR AFRICAN AMERICAN: >60 ML/MIN/1.73SQ.M
EGFR IF NONAFRICAN AMERICAN: >60 ML/MIN/1.73SQ.M
EMPLOYED IN HEALTHCARE: NORMAL
EOSINOPHILS RELATIVE PERCENT: 0.3 %
FIRST TEST: NORMAL
GLUCOSE: 83 MG/DL (ref 65–99)
HBA1C MFR BLD: 5 % (ref 4.4–6.4)
HCT VFR BLD CALC: 42.1 % (ref 35–47)
HDLC SERPL-MCNC: 97 MG/DL
HEMOGLOBIN: 14.1 G/DL (ref 11.7–15.5)
HOSPITALIZED: NORMAL
ICU: NORMAL
LDL CHOLESTEROL CALCULATED: 63 MG/DL
LDL/HDL RATIO: 0.6
LYMPHOCYTE %: 19.8 %
MCH RBC QN AUTO: 30 PG (ref 27–34)
MCHC RBC AUTO-ENTMCNC: 33.5 G/DL (ref 32–36)
MCV RBC AUTO: 90 FL (ref 80–100)
MONOCYTES # BLD: 3.6 %
NEUTROPHILS RELATIVE PERCENT: 75.6 %
PDW BLD-RTO: 13.4 % (ref 11.5–15)
PLATELETS: 222 X10E9/L (ref 150–450)
PMV BLD AUTO: 9.7 FL (ref 7–12)
POTASSIUM SERPL-SCNC: 4 MMOL/L (ref 3.5–5)
PREGNANT ?: NORMAL
RBC: 4.7 X10E12/L (ref 3.8–5.2)
RESIDES IN CONGREGATE CARE SETTING: NORMAL
SARS-COV-2 ANTIBODY, TOTAL: <0.4 U/ML
SARS-COV-2 ANTIBODY, TOTAL: NEGATIVE
SODIUM BLD-SCNC: 139 MMOL/L (ref 134–146)
SYMPTOMATIC AS DEFINED BY THE CDC: NORMAL
TOTAL PROTEIN: 7 G/DL (ref 6–8)
TRIGL SERPL-MCNC: 98 MG/DL (ref 27–150)
TSH SERPL DL<=0.05 MIU/L-ACNC: 0.9 UIU/ML (ref 0.49–4.67)
VLDLC SERPL CALC-MCNC: 20 MG/DL (ref 0–30)
WBC: 8.5 X10E9/L (ref 4–11)

## 2021-10-21 ENCOUNTER — TELEPHONE (OUTPATIENT)
Dept: FAMILY MEDICINE CLINIC | Age: 36
End: 2021-10-21

## 2021-10-21 NOTE — TELEPHONE ENCOUNTER
----- Message from Xin Pulliam sent at 10/20/2021  4:14 PM EDT -----  Subject: Message to Provider    QUESTIONS  Information for Provider? Pt returning call from the office after hours. She believes in was regarding bloodwork she had taken 10/19/2021. Please   call pt back in the morning.  ---------------------------------------------------------------------------  --------------  CALL BACK INFO  What is the best way for the office to contact you? OK to leave message on   voicemail  Preferred Call Back Phone Number? [phone]  ---------------------------------------------------------------------------  --------------  SCRIPT ANSWERS  Relationship to Patient?  Self

## 2022-06-20 ENCOUNTER — ANESTHESIA (OUTPATIENT)
Dept: LABOR AND DELIVERY | Age: 37
End: 2022-06-20
Payer: COMMERCIAL

## 2022-06-20 ENCOUNTER — APPOINTMENT (OUTPATIENT)
Dept: LABOR AND DELIVERY | Age: 37
End: 2022-06-20
Payer: COMMERCIAL

## 2022-06-20 ENCOUNTER — ANESTHESIA EVENT (OUTPATIENT)
Dept: LABOR AND DELIVERY | Age: 37
End: 2022-06-20
Payer: COMMERCIAL

## 2022-06-20 ENCOUNTER — HOSPITAL ENCOUNTER (INPATIENT)
Age: 37
LOS: 1 days | Discharge: HOME | End: 2022-06-21
Attending: OBSTETRICS & GYNECOLOGY | Admitting: OBSTETRICS & GYNECOLOGY
Payer: COMMERCIAL

## 2022-06-20 PROBLEM — Z34.90 ENCOUNTER FOR ELECTIVE INDUCTION OF LABOR: Status: ACTIVE | Noted: 2022-06-20

## 2022-06-20 LAB
ABO: NORMAL
AMPHETAMINE+METHAMPHETAMINE URINE SCREEN: NEGATIVE
ANTIBODY SCREEN: NORMAL
BARBITURATE QUANTITATIVE URINE: NEGATIVE
BASOPHILS # BLD: 0.4 %
BASOPHILS ABSOLUTE: 0 THOU/MM3 (ref 0–0.1)
BENZODIAZEPINE QUANTITATIVE URINE: NEGATIVE
CANNABINOID QUANTITATIVE URINE: NEGATIVE
COCAINE METABOLITE QUANTITATIVE URINE: NEGATIVE
EOSINOPHIL # BLD: 0.7 %
EOSINOPHILS ABSOLUTE: 0.1 THOU/MM3 (ref 0–0.4)
ERYTHROCYTE [DISTWIDTH] IN BLOOD BY AUTOMATED COUNT: 13 % (ref 11.5–14.5)
ERYTHROCYTE [DISTWIDTH] IN BLOOD BY AUTOMATED COUNT: 42.7 FL (ref 35–45)
HCT VFR BLD CALC: 36.8 % (ref 37–47)
HEMOGLOBIN: 11.9 GM/DL (ref 12–16)
IMMATURE GRANS (ABS): 0.06 THOU/MM3 (ref 0–0.07)
IMMATURE GRANULOCYTES: 0.8 %
LYMPHOCYTES # BLD: 19.2 %
LYMPHOCYTES ABSOLUTE: 1.4 THOU/MM3 (ref 1–4.8)
MCH RBC QN AUTO: 29.4 PG (ref 26–33)
MCHC RBC AUTO-ENTMCNC: 32.3 GM/DL (ref 32.2–35.5)
MCV RBC AUTO: 90.9 FL (ref 81–99)
MONOCYTES # BLD: 5.3 %
MONOCYTES ABSOLUTE: 0.4 THOU/MM3 (ref 0.4–1.3)
NUCLEATED RED BLOOD CELLS: 0 /100 WBC
OPIATES, URINE: NEGATIVE
OXYCODONE: NEGATIVE
PHENCYCLIDINE QUANTITATIVE URINE: NEGATIVE
PLATELET # BLD: 165 THOU/MM3 (ref 130–400)
PMV BLD AUTO: 12.2 FL (ref 9.4–12.4)
RBC # BLD: 4.05 MILL/MM3 (ref 4.2–5.4)
RH FACTOR: NORMAL
RPR: NONREACTIVE
SEG NEUTROPHILS: 73.6 %
SEGMENTED NEUTROPHILS ABSOLUTE COUNT: 5.4 THOU/MM3 (ref 1.8–7.7)
WBC # BLD: 7.4 THOU/MM3 (ref 4.8–10.8)

## 2022-06-20 PROCEDURE — 1200000000 HC SEMI PRIVATE

## 2022-06-20 PROCEDURE — 86901 BLOOD TYPING SEROLOGIC RH(D): CPT

## 2022-06-20 PROCEDURE — 85025 COMPLETE CBC W/AUTO DIFF WBC: CPT

## 2022-06-20 PROCEDURE — 2500000003 HC RX 250 WO HCPCS: Performed by: STUDENT IN AN ORGANIZED HEALTH CARE EDUCATION/TRAINING PROGRAM

## 2022-06-20 PROCEDURE — 2500000003 HC RX 250 WO HCPCS: Performed by: OBSTETRICS & GYNECOLOGY

## 2022-06-20 PROCEDURE — 6360000002 HC RX W HCPCS

## 2022-06-20 PROCEDURE — 6360000002 HC RX W HCPCS: Performed by: NURSE ANESTHETIST, CERTIFIED REGISTERED

## 2022-06-20 PROCEDURE — 6370000000 HC RX 637 (ALT 250 FOR IP)

## 2022-06-20 PROCEDURE — 10907ZC DRAINAGE OF AMNIOTIC FLUID, THERAPEUTIC FROM PRODUCTS OF CONCEPTION, VIA NATURAL OR ARTIFICIAL OPENING: ICD-10-PCS | Performed by: OBSTETRICS & GYNECOLOGY

## 2022-06-20 PROCEDURE — 3700000025 EPIDURAL BLOCK: Performed by: STUDENT IN AN ORGANIZED HEALTH CARE EDUCATION/TRAINING PROGRAM

## 2022-06-20 PROCEDURE — 80307 DRUG TEST PRSMV CHEM ANLYZR: CPT

## 2022-06-20 PROCEDURE — 6370000000 HC RX 637 (ALT 250 FOR IP): Performed by: OBSTETRICS & GYNECOLOGY

## 2022-06-20 PROCEDURE — 7200000001 HC VAGINAL DELIVERY

## 2022-06-20 PROCEDURE — 86592 SYPHILIS TEST NON-TREP QUAL: CPT

## 2022-06-20 PROCEDURE — 86900 BLOOD TYPING SEROLOGIC ABO: CPT

## 2022-06-20 PROCEDURE — 3E033VJ INTRODUCTION OF OTHER HORMONE INTO PERIPHERAL VEIN, PERCUTANEOUS APPROACH: ICD-10-PCS | Performed by: OBSTETRICS & GYNECOLOGY

## 2022-06-20 PROCEDURE — 0HQ9XZZ REPAIR PERINEUM SKIN, EXTERNAL APPROACH: ICD-10-PCS | Performed by: OBSTETRICS & GYNECOLOGY

## 2022-06-20 PROCEDURE — 2580000003 HC RX 258: Performed by: OBSTETRICS & GYNECOLOGY

## 2022-06-20 PROCEDURE — 86850 RBC ANTIBODY SCREEN: CPT

## 2022-06-20 RX ORDER — ZOLPIDEM TARTRATE 10 MG/1
10 TABLET ORAL NIGHTLY PRN
Status: DISCONTINUED | OUTPATIENT
Start: 2022-06-20 | End: 2022-06-21 | Stop reason: HOSPADM

## 2022-06-20 RX ORDER — METHYLERGONOVINE MALEATE 0.2 MG/ML
200 INJECTION INTRAVENOUS PRN
Status: DISCONTINUED | OUTPATIENT
Start: 2022-06-20 | End: 2022-06-20 | Stop reason: HOSPADM

## 2022-06-20 RX ORDER — SODIUM CHLORIDE 0.9 % (FLUSH) 0.9 %
5-40 SYRINGE (ML) INJECTION EVERY 12 HOURS SCHEDULED
Status: DISCONTINUED | OUTPATIENT
Start: 2022-06-20 | End: 2022-06-21 | Stop reason: HOSPADM

## 2022-06-20 RX ORDER — MISOPROSTOL 200 UG/1
1000 TABLET ORAL PRN
Status: DISCONTINUED | OUTPATIENT
Start: 2022-06-20 | End: 2022-06-21 | Stop reason: HOSPADM

## 2022-06-20 RX ORDER — OXYCODONE HYDROCHLORIDE 5 MG/1
5 TABLET ORAL EVERY 4 HOURS PRN
Status: DISCONTINUED | OUTPATIENT
Start: 2022-06-20 | End: 2022-06-21 | Stop reason: HOSPADM

## 2022-06-20 RX ORDER — BUTORPHANOL TARTRATE 1 MG/ML
1 INJECTION, SOLUTION INTRAMUSCULAR; INTRAVENOUS
Status: DISCONTINUED | OUTPATIENT
Start: 2022-06-20 | End: 2022-06-20 | Stop reason: HOSPADM

## 2022-06-20 RX ORDER — DIPHENHYDRAMINE HYDROCHLORIDE 50 MG/ML
25 INJECTION INTRAMUSCULAR; INTRAVENOUS EVERY 4 HOURS PRN
Status: DISCONTINUED | OUTPATIENT
Start: 2022-06-20 | End: 2022-06-20 | Stop reason: HOSPADM

## 2022-06-20 RX ORDER — TERBUTALINE SULFATE 1 MG/ML
0.25 INJECTION, SOLUTION SUBCUTANEOUS
Status: DISCONTINUED | OUTPATIENT
Start: 2022-06-20 | End: 2022-06-20 | Stop reason: HOSPADM

## 2022-06-20 RX ORDER — SODIUM CHLORIDE 9 MG/ML
INJECTION, SOLUTION INTRAVENOUS PRN
Status: DISCONTINUED | OUTPATIENT
Start: 2022-06-20 | End: 2022-06-21 | Stop reason: HOSPADM

## 2022-06-20 RX ORDER — SEVOFLURANE 250 ML/250ML
1 LIQUID RESPIRATORY (INHALATION) CONTINUOUS PRN
Status: DISCONTINUED | OUTPATIENT
Start: 2022-06-20 | End: 2022-06-20 | Stop reason: HOSPADM

## 2022-06-20 RX ORDER — ACETAMINOPHEN 325 MG/1
650 TABLET ORAL EVERY 4 HOURS PRN
Status: DISCONTINUED | OUTPATIENT
Start: 2022-06-20 | End: 2022-06-20 | Stop reason: HOSPADM

## 2022-06-20 RX ORDER — ONDANSETRON 2 MG/ML
8 INJECTION INTRAMUSCULAR; INTRAVENOUS EVERY 6 HOURS PRN
Status: DISCONTINUED | OUTPATIENT
Start: 2022-06-20 | End: 2022-06-20 | Stop reason: HOSPADM

## 2022-06-20 RX ORDER — IBUPROFEN 800 MG/1
800 TABLET ORAL EVERY 8 HOURS PRN
Status: DISCONTINUED | OUTPATIENT
Start: 2022-06-20 | End: 2022-06-21 | Stop reason: HOSPADM

## 2022-06-20 RX ORDER — OXYCODONE HYDROCHLORIDE 5 MG/1
10 TABLET ORAL EVERY 4 HOURS PRN
Status: DISCONTINUED | OUTPATIENT
Start: 2022-06-20 | End: 2022-06-21 | Stop reason: HOSPADM

## 2022-06-20 RX ORDER — MODIFIED LANOLIN
OINTMENT (GRAM) TOPICAL PRN
Status: DISCONTINUED | OUTPATIENT
Start: 2022-06-20 | End: 2022-06-21 | Stop reason: HOSPADM

## 2022-06-20 RX ORDER — SODIUM CHLORIDE, SODIUM LACTATE, POTASSIUM CHLORIDE, CALCIUM CHLORIDE 600; 310; 30; 20 MG/100ML; MG/100ML; MG/100ML; MG/100ML
INJECTION, SOLUTION INTRAVENOUS CONTINUOUS
Status: DISCONTINUED | OUTPATIENT
Start: 2022-06-20 | End: 2022-06-20

## 2022-06-20 RX ORDER — NALOXONE HYDROCHLORIDE 0.4 MG/ML
INJECTION, SOLUTION INTRAMUSCULAR; INTRAVENOUS; SUBCUTANEOUS PRN
Status: DISCONTINUED | OUTPATIENT
Start: 2022-06-20 | End: 2022-06-20 | Stop reason: HOSPADM

## 2022-06-20 RX ORDER — ROPIVACAINE HYDROCHLORIDE 2 MG/ML
INJECTION, SOLUTION EPIDURAL; INFILTRATION; PERINEURAL
Status: COMPLETED
Start: 2022-06-20 | End: 2022-06-20

## 2022-06-20 RX ORDER — IBUPROFEN 800 MG/1
800 TABLET ORAL EVERY 8 HOURS PRN
Status: DISCONTINUED | OUTPATIENT
Start: 2022-06-20 | End: 2022-06-20 | Stop reason: HOSPADM

## 2022-06-20 RX ORDER — SODIUM CHLORIDE, SODIUM LACTATE, POTASSIUM CHLORIDE, AND CALCIUM CHLORIDE .6; .31; .03; .02 G/100ML; G/100ML; G/100ML; G/100ML
1000 INJECTION, SOLUTION INTRAVENOUS PRN
Status: DISCONTINUED | OUTPATIENT
Start: 2022-06-20 | End: 2022-06-20 | Stop reason: HOSPADM

## 2022-06-20 RX ORDER — ROPIVACAINE HYDROCHLORIDE 2 MG/ML
INJECTION, SOLUTION EPIDURAL; INFILTRATION; PERINEURAL PRN
Status: DISCONTINUED | OUTPATIENT
Start: 2022-06-20 | End: 2022-06-21 | Stop reason: SDUPTHER

## 2022-06-20 RX ORDER — DOCUSATE SODIUM 100 MG/1
100 CAPSULE, LIQUID FILLED ORAL 2 TIMES DAILY PRN
Status: DISCONTINUED | OUTPATIENT
Start: 2022-06-20 | End: 2022-06-21 | Stop reason: HOSPADM

## 2022-06-20 RX ORDER — MISOPROSTOL 200 UG/1
1000 TABLET ORAL PRN
Status: DISCONTINUED | OUTPATIENT
Start: 2022-06-20 | End: 2022-06-20 | Stop reason: HOSPADM

## 2022-06-20 RX ORDER — DOCUSATE SODIUM 100 MG/1
100 CAPSULE, LIQUID FILLED ORAL PRN
COMMUNITY

## 2022-06-20 RX ORDER — MORPHINE SULFATE 4 MG/ML
4 INJECTION, SOLUTION INTRAMUSCULAR; INTRAVENOUS
Status: DISCONTINUED | OUTPATIENT
Start: 2022-06-20 | End: 2022-06-20

## 2022-06-20 RX ORDER — MORPHINE SULFATE 2 MG/ML
2 INJECTION, SOLUTION INTRAMUSCULAR; INTRAVENOUS
Status: DISCONTINUED | OUTPATIENT
Start: 2022-06-20 | End: 2022-06-20 | Stop reason: HOSPADM

## 2022-06-20 RX ORDER — ONDANSETRON 2 MG/ML
4 INJECTION INTRAMUSCULAR; INTRAVENOUS EVERY 6 HOURS PRN
Status: DISCONTINUED | OUTPATIENT
Start: 2022-06-20 | End: 2022-06-21 | Stop reason: HOSPADM

## 2022-06-20 RX ORDER — SODIUM CHLORIDE 0.9 % (FLUSH) 0.9 %
5-40 SYRINGE (ML) INJECTION PRN
Status: DISCONTINUED | OUTPATIENT
Start: 2022-06-20 | End: 2022-06-21 | Stop reason: HOSPADM

## 2022-06-20 RX ORDER — METHYLERGONOVINE MALEATE 0.2 MG/ML
200 INJECTION INTRAVENOUS PRN
Status: DISCONTINUED | OUTPATIENT
Start: 2022-06-20 | End: 2022-06-21 | Stop reason: HOSPADM

## 2022-06-20 RX ORDER — CARBOPROST TROMETHAMINE 250 UG/ML
250 INJECTION, SOLUTION INTRAMUSCULAR PRN
Status: DISCONTINUED | OUTPATIENT
Start: 2022-06-20 | End: 2022-06-21 | Stop reason: HOSPADM

## 2022-06-20 RX ORDER — FERROUS SULFATE 325(65) MG
325 TABLET ORAL
Status: DISCONTINUED | OUTPATIENT
Start: 2022-06-21 | End: 2022-06-21 | Stop reason: HOSPADM

## 2022-06-20 RX ORDER — LIDOCAINE HYDROCHLORIDE 10 MG/ML
30 INJECTION, SOLUTION EPIDURAL; INFILTRATION; INTRACAUDAL; PERINEURAL PRN
Status: COMPLETED | OUTPATIENT
Start: 2022-06-20 | End: 2022-06-20

## 2022-06-20 RX ORDER — FAMOTIDINE 20 MG/1
20 TABLET, FILM COATED ORAL 2 TIMES DAILY PRN
Status: DISCONTINUED | OUTPATIENT
Start: 2022-06-20 | End: 2022-06-21 | Stop reason: HOSPADM

## 2022-06-20 RX ORDER — CARBOPROST TROMETHAMINE 250 UG/ML
250 INJECTION, SOLUTION INTRAMUSCULAR PRN
Status: DISCONTINUED | OUTPATIENT
Start: 2022-06-20 | End: 2022-06-20 | Stop reason: HOSPADM

## 2022-06-20 RX ORDER — ASPIRIN 81 MG/1
81 TABLET, CHEWABLE ORAL DAILY
COMMUNITY

## 2022-06-20 RX ORDER — MORPHINE SULFATE 2 MG/ML
2 INJECTION, SOLUTION INTRAMUSCULAR; INTRAVENOUS
Status: DISCONTINUED | OUTPATIENT
Start: 2022-06-20 | End: 2022-06-20

## 2022-06-20 RX ORDER — ACETAMINOPHEN 500 MG
1000 TABLET ORAL EVERY 8 HOURS PRN
Status: DISCONTINUED | OUTPATIENT
Start: 2022-06-20 | End: 2022-06-21 | Stop reason: HOSPADM

## 2022-06-20 RX ORDER — SODIUM CHLORIDE, SODIUM LACTATE, POTASSIUM CHLORIDE, AND CALCIUM CHLORIDE .6; .31; .03; .02 G/100ML; G/100ML; G/100ML; G/100ML
500 INJECTION, SOLUTION INTRAVENOUS PRN
Status: DISCONTINUED | OUTPATIENT
Start: 2022-06-20 | End: 2022-06-20 | Stop reason: HOSPADM

## 2022-06-20 RX ORDER — ONDANSETRON 4 MG/1
8 TABLET, ORALLY DISINTEGRATING ORAL EVERY 8 HOURS PRN
Status: DISCONTINUED | OUTPATIENT
Start: 2022-06-20 | End: 2022-06-21 | Stop reason: HOSPADM

## 2022-06-20 RX ORDER — DIPHENHYDRAMINE HCL 25 MG
25 TABLET ORAL EVERY 6 HOURS PRN
Status: DISCONTINUED | OUTPATIENT
Start: 2022-06-20 | End: 2022-06-21 | Stop reason: HOSPADM

## 2022-06-20 RX ORDER — SODIUM CHLORIDE, SODIUM LACTATE, POTASSIUM CHLORIDE, CALCIUM CHLORIDE 600; 310; 30; 20 MG/100ML; MG/100ML; MG/100ML; MG/100ML
INJECTION, SOLUTION INTRAVENOUS CONTINUOUS
Status: DISCONTINUED | OUTPATIENT
Start: 2022-06-20 | End: 2022-06-21 | Stop reason: HOSPADM

## 2022-06-20 RX ORDER — MORPHINE SULFATE 4 MG/ML
4 INJECTION, SOLUTION INTRAMUSCULAR; INTRAVENOUS
Status: DISCONTINUED | OUTPATIENT
Start: 2022-06-20 | End: 2022-06-20 | Stop reason: HOSPADM

## 2022-06-20 RX ORDER — ONDANSETRON 2 MG/ML
4 INJECTION INTRAMUSCULAR; INTRAVENOUS EVERY 6 HOURS PRN
Status: DISCONTINUED | OUTPATIENT
Start: 2022-06-20 | End: 2022-06-20 | Stop reason: HOSPADM

## 2022-06-20 RX ADMIN — SODIUM CHLORIDE, POTASSIUM CHLORIDE, SODIUM LACTATE AND CALCIUM CHLORIDE: 600; 310; 30; 20 INJECTION, SOLUTION INTRAVENOUS at 08:44

## 2022-06-20 RX ADMIN — Medication 166.7 ML: at 13:03

## 2022-06-20 RX ADMIN — IBUPROFEN 800 MG: 800 TABLET, FILM COATED ORAL at 13:50

## 2022-06-20 RX ADMIN — SODIUM CHLORIDE, POTASSIUM CHLORIDE, SODIUM LACTATE AND CALCIUM CHLORIDE: 600; 310; 30; 20 INJECTION, SOLUTION INTRAVENOUS at 12:17

## 2022-06-20 RX ADMIN — LIDOCAINE HYDROCHLORIDE 30 ML: 10 INJECTION, SOLUTION EPIDURAL; INFILTRATION; INTRACAUDAL; PERINEURAL at 11:07

## 2022-06-20 RX ADMIN — DOCUSATE SODIUM 100 MG: 100 CAPSULE, LIQUID FILLED ORAL at 20:30

## 2022-06-20 RX ADMIN — Medication: at 13:50

## 2022-06-20 RX ADMIN — SODIUM CHLORIDE, POTASSIUM CHLORIDE, SODIUM LACTATE AND CALCIUM CHLORIDE: 600; 310; 30; 20 INJECTION, SOLUTION INTRAVENOUS at 07:45

## 2022-06-20 RX ADMIN — ACETAMINOPHEN 1000 MG: 500 TABLET ORAL at 20:30

## 2022-06-20 RX ADMIN — Medication 1 MILLI-UNITS/MIN: at 08:20

## 2022-06-20 RX ADMIN — ROPIVACAINE HYDROCHLORIDE 10 ML: 2 INJECTION, SOLUTION EPIDURAL; INFILTRATION at 10:39

## 2022-06-20 RX ADMIN — Medication 16 ML/HR: at 10:40

## 2022-06-20 ASSESSMENT — PAIN DESCRIPTION - ORIENTATION: ORIENTATION: LOWER

## 2022-06-20 ASSESSMENT — PAIN DESCRIPTION - LOCATION
LOCATION: PERINEUM
LOCATION: ABDOMEN;PERINEUM

## 2022-06-20 ASSESSMENT — PAIN DESCRIPTION - DESCRIPTORS
DESCRIPTORS: CRAMPING;SORE
DESCRIPTORS: DISCOMFORT;CRAMPING

## 2022-06-20 ASSESSMENT — PAIN - FUNCTIONAL ASSESSMENT: PAIN_FUNCTIONAL_ASSESSMENT: ACTIVITIES ARE NOT PREVENTED

## 2022-06-20 ASSESSMENT — PAIN SCALES - GENERAL
PAINLEVEL_OUTOF10: 3
PAINLEVEL_OUTOF10: 4

## 2022-06-20 NOTE — FLOWSHEET NOTE
Pt up to bathroom, voided large amount,  Mercedes care reinforced,  IV infusion completed,  IV discontinued, tip intact.

## 2022-06-20 NOTE — ANESTHESIA PRE PROCEDURE
Department of Anesthesiology  Preprocedure Note       Name:  August Marinelli   Age:  31 y.o.  :  1985                                          MRN:  [id number]         Date:  2022      Surgeon: * No surgeons listed *    Procedure: * No procedures listed *    Medications prior to admission:   Prior to Admission medications    Medication Sig Start Date End Date Taking?  Authorizing Provider   aspirin 81 MG chewable tablet Take 81 mg by mouth daily   Yes Historical Provider, MD   docusate sodium (COLACE) 100 MG capsule Take 100 mg by mouth as needed for Constipation   Yes Historical Provider, MD   diphenhydrAMINE-APAP, sleep, (TYLENOL PM EXTRA STRENGTH PO) Take 1 tablet by mouth as needed   Yes Historical Provider, MD   Prenatal Multivit-Min-Fe-FA (PRE- FORMULA PO) Take 2 tablets by mouth daily     Historical Provider, MD       Current medications:    Current Facility-Administered Medications   Medication Dose Route Frequency Provider Last Rate Last Admin    oxytocin (PITOCIN) 30 units in 500 mL infusion  1 ruddy-units/min IntraVENous Continuous Anjana Rivas MD 4 mL/hr at 22 1003 4 ruddy-units/min at 22 1003    terbutaline (BRETHINE) injection 0.25 mg  0.25 mg SubCUTAneous Once PRN Anjana Rivas MD        lactated ringers infusion   IntraVENous Continuous Anjana Rivas  mL/hr at 22 1028 Rate Change at 22 1028    lactated ringers bolus  500 mL IntraVENous PRN Anjana Rivas MD        Or    lactated ringers bolus  1,000 mL IntraVENous PRN Anjana Rivas MD        lidocaine PF 1 % injection 30 mL  30 mL Other PRN Anjana Rivas MD        butorphanol (STADOL) injection 1 mg  1 mg IntraVENous Q1H PRN Anjana Rivas MD        nitrous oxide 50% inhalation 1 each  1 each Inhalation Continuous PRN Anjana Rivas MD        ondansetron (ZOFRAN) injection 8 mg  8 mg IntraVENous Q6H PRN MD Tony Harrell diphenhydrAMINE (BENADRYL) injection 25 mg  25 mg IntraVENous Q4H PRN Roberto Carlos Luna MD        oxytocin (PITOCIN) 30 units in 500 mL infusion  87.3 ruddy-units/min IntraVENous PRN Roberto Carlos Luna MD        And    oxytocin (PITOCIN) 10 unit bolus from the bag  10 Units IntraVENous PRCHARLENE Luna MD        methylergonovine (METHERGINE) injection 200 mcg  200 mcg IntraMUSCular PRCHARLENE Luna MD        carboprost (HEMABATE) injection 250 mcg  250 mcg IntraMUSCular PRN Roberto Carlos Luna MD        miSOPROStol (CYTOTEC) tablet 1,000 mcg  1,000 mcg Rectal PRN Roberto Carlos Luna MD        acetaminophen (TYLENOL) tablet 650 mg  650 mg Oral Q4H PRN Roberto Carlos Luna MD        ibuprofen (ADVIL;MOTRIN) tablet 800 mg  800 mg Oral Q8H PRN Roberto Carlos Luna MD        morphine (PF) injection 2 mg  2 mg IntraVENous Q2H PRN Roberto Carlos Luna MD        Or    morphine injection 4 mg  4 mg IntraVENous Q2H PRN Roberto Carlos Luna MD        witch hazel-glycerin (TUCKS) pad   Topical PRN Roberto Carlos Luna MD        benzocaine-menthol (DERMOPLAST) 20-0.5 % spray   Topical PRN Roberto Carlos Luna MD        ropivacaine (NAROPIN) 0.2% injection 0.2%             naloxone 0.4 mg in 10 mL sodium chloride syringe   IntraVENous PRN Lauryn Kim DO        [facility]dansetron [facility]) injection 4 mg  4 mg IntraVENous Q6H PRN Lauryn Kim DO        fentaNYL 750 mcg, ropivacaine 0.1% in sodium chloride 0.9% 265mL (OB) epidural  16 mL/hr Epidural Continuous Lauryn Kim DO           Allergies:     Allergies   Allergen Reactions    Morphine Nausea And Vomiting       Problem List:    Patient Active Problem List   Diagnosis Code    Abdominal pain, right lower quadrant R10.31    Leukocytosis D72.829    Normal delivery O80    Missed ab O02.1     (spontaneous vaginal delivery) O80    Encounter for elective induction of labor Z34.90       Past Medical History:        Diagnosis Date  Abnormal Pap smear of cervix     age 27    Rh incompatibility     Rhogam given in 2015       Past Surgical History:        Procedure Laterality Date    APPENDECTOMY      ARM SURGERY      broke R arm as a child    DILATION AND CURETTAGE OF UTERUS  2011    DILATION AND CURETTAGE OF UTERUS N/A 3/4/2019    DILATATION AND CURETTAGE SUCTION performed by Memo Mason MD at [facility] OTHER SURGICAL HISTORY  4/15/2013    LAPAROSCOPIC APPENDECTOMY       Social History:    Social History     Tobacco Use    Smoking status: Never Smoker    Smokeless tobacco: Never Used   Substance Use Topics    Alcohol use: Not Currently     Comment: social                                Counseling given: Not Answered      Vital Signs (Current):   Vitals:    06/20/22 1020 06/20/22 1036 06/20/22 1041 06/20/22 1043   BP: 130/86 (!) 142/80 121/77    Pulse: 63 71 74    Resp:  16 16    Temp:    36.6 °C (97.9 °F)   TempSrc:    Temporal   SpO2:  97%     Weight:       Height:                                                  BP Readings from Last 3 Encounters:   06/20/22 121/77   06/17/20 111/66   03/04/19 106/73       NPO Status:                                                                                 BMI:   Wt Readings from Last 3 Encounters:   06/20/22 165 lb (74.8 kg)   06/16/20 161 lb (73 kg)   03/04/19 134 lb (60.8 kg)     Body mass index is 28.32 kg/m².     CBC:   Lab Results   Component Value Date    WBC 7.4 06/20/2022    RBC 4.05 06/20/2022    RBC 4.70 11/29/2021    HGB 11.9 06/20/2022    HCT 36.8 06/20/2022    MCV 90.9 06/20/2022    RDW 13.3 11/29/2021     06/20/2022       CMP:   Lab Results   Component Value Date     10/18/2021    K 4.0 10/18/2021     10/18/2021    CO2 25 10/18/2021    BUN 20 10/18/2021    CREATININE 0.81 10/18/2021    LABGLOM >90 04/15/2013    GLUCOSE 83 10/18/2021    PROT 7.0 10/18/2021    CALCIUM 9.4 10/18/2021    BILITOT 0.9 10/18/2021    ALKPHOS 42 10/18/2021 ALKPHOS 55 04/15/2013    AST 14 10/18/2021    ALT 10 10/18/2021       POC Tests: No results for input(s): POCGLU, POCNA, POCK, POCCL, POCBUN, POCHEMO, POCHCT in the last 72 hours. Coags: No results found for: PROTIME, INR, APTT    HCG (If Applicable):   Lab Results   Component Value Date    PREGTESTUR NEGATIVE 03/26/2012    PREGSERUM NEGATIVE 04/15/2013        ABGs: No results found for: PHART, PO2ART, DMG1UXU, XBX9HGL, BEART, E0QVOIBS     Type & Screen (If Applicable):  Lab Results   Component Value Date    LABABO O 11/29/2021    [address] POS 06/20/2022       Drug/Infectious Status (If Applicable):  Lab Results   Component Value Date    HEPCAB Non-Reactive 11/29/2021       COVID-19 Screening (If Applicable):   Lab Results   Component Value Date    COVID19 Negative 10/18/2021    COVID19 <0.4 10/18/2021           Anesthesia Evaluation  Patient summary reviewed and Nursing notes reviewed no history of anesthetic complications:   Airway: Mallampati: II  TM distance: >3 FB   Neck ROM: full  Mouth opening: > = 3 FB   Dental: normal exam         Pulmonary:Negative Pulmonary ROS and normal exam                               Cardiovascular:Negative CV ROS                      Neuro/Psych:   Negative Neuro/Psych ROS              GI/Hepatic/Renal: Neg GI/Hepatic/Renal ROS            Endo/Other: Negative Endo/Other ROS                    Abdominal:             Vascular: negative vascular ROS. Other Findings:           Anesthesia Plan      epidural     ASA 2             Anesthetic plan and risks discussed with patient. Plan discussed with attending.                     Reinier Barnard, APRN - CRNA   6/20/2022

## 2022-06-20 NOTE — FLOWSHEET NOTE
Dr Jamil Guardado in department informed pt has arrived for induction of labor. RN will notify when IV is in place for ROM.

## 2022-06-20 NOTE — H&P
[facility]  History and Physical Update    Pt Name: Beba Carbone  MRN: [id number]  YOB: 1985  Date of evaluation: 2022    [] I have examined the patient and reviewed the H&P/Consult and there are no changes to the patient or plans. [x] I have examined the patient and reviewed the H&P/Consult and have noted the following changes:   30 yo  at 45 5/7 weeks with COVID in the pregnancy and concern for damage from a viral infection and suspected placental insufficiency. cvx 2+/70/-2  AROM scant  Ctx q 6  FHTs reactive  Term gestation  Anticipate     Discussion with the patient and/ or family for proposed care, treatment, services; benefits, risks, side effects; likelihood of achieving goals and potential problems that may occur during recuperation was had and all questions were answered. Discussion with the patient and/ or family of reasonable alternatives to the proposed care, treatment, services and the discussion of the risks, benefits, side effects related to the alternatives and the risk related to not receiving the proposed care treatment services was also had and all questions were answered. If this is for an elective surgical procedure then The patient was counseled at length about the risks of ivanna Covid-19 during their perioperative period and any recovery window from their procedure. The patient was made aware that ivanna Covid-19  may worsen their prognosis for recovering from their procedure  and lend to a higher morbidity and/or mortality risk. All material risks, benefits, and reasonable alternatives including postponing the procedure were discussed. The patient  does wish to proceed with the procedure at this time.              Fior Oliver MD,MD  Electronically signed 2022 at 8:04 AM

## 2022-06-20 NOTE — L&D DELIVERY NOTE
Department of Obstetrics and Gynecology  Spontaneous Vaginal Delivery Note      Pt Name: Jacquie Agrawal  MRN: [id number] [place] #: [de-identified]  YOB: 1985  Procedure Performed By: Karon José MD, MD      Pre-operative Diagnosis:  Term pregnancy, Induced labor and Pregnancy complicated by: Mason Narvaez and suspected placental insufficiency  Post-operative Diagnosis: Same, delivered. Procedure:  Spontaneous vaginal delivery and Repair first degree spontaneous laceration  Surgeon:  Mary Freeze    Information for the patient's :  Teddydemetriusezio Mortimer [[id number]]          Anesthesia:  epidural anesthesia  Estimated blood loss:  300ml  Specimen:  Placenta not sent to pathology   Complications:  CAN x 1  Condition:  infant stable to general nursery and mother stable    Details of Procedure: The patient is a 31 y.o. female at 44w7d   OB History        6    Para   2    Term   2            AB   3    Living   1       SAB   3    IAB        Ectopic        Molar        Multiple   0    Live Births   1             who was admitted for induction. She received the following interventions: ARBOW and IV Pitocin induction. The patient progressed well,did receive an epidural, became complete and started to push. She was placed in the dorsal lithotomy position and prepped. She delivered the vertex over an intact perineum . The rest of the infant delivered atraumatically, placed on mother abdomen. The nurse attended the baby. The cord was clamped and cut after a minute. The baby stayed with mom and skin to skin was implemented. The placenta delivered intact, whole and that the umbilical cord had three vessels noted. The perineum and vagina were explored and a first degree laceration was repaired in standard fashion with 3-0 Vicryl. A vaginal sweep was performed and there were no retained products and 2 needles were taken off the field. The count was correct.     Delivery Summary:  Information for the patient's :  Ryan Islas [[id number]]        Labor & Delivery Summary  Labor Onset Date: 22  Labor Onset Time: 0900  Dilation Complete Date: 22  Dilation Complete Time: 1244       PMH:  Past Medical History:   Diagnosis Date    Abnormal Pap smear of cervix     age 27    Rh incompatibility     Rhogam given in        Remington Pereira MD 2022 1:20 PM

## 2022-06-20 NOTE — PLAN OF CARE
Care plan reviewed with patient .   Patient   Problem: Pain  Goal: Verbalizes/displays adequate comfort level or baseline comfort level  2022 by Milton Barboza RN  Outcome: Completed  2022 by Milton Barboza RN  Outcome: Progressing  Flowsheets (Taken 2022)  Verbalizes/displays adequate comfort level or baseline comfort level:   Encourage patient to monitor pain and request assistance   Assess pain using appropriate pain scale   Administer analgesics based on type and severity of pain and evaluate response     Problem: Vaginal Birth or  Section  Goal: Fetal and maternal status remain reassuring during the birth process  Description:  Birth OB-Pregnancy care plan goal which identifies if the fetal and maternal status remain reassuring during the birth process  2022 by Milton Barboza RN  Outcome: Completed  2022 by Milton Barboza RN  Outcome: Progressing  Flowsheets (Taken 2022)  Fetal and Maternal Status Remain Reassuring During the Birth Process:   Monitor vital signs   Monitor uterine activity   Monitor fetal heart rate   Monitor labor progression (Vaginal delivery)     Problem: Infection - Adult  Goal: Absence of infection at discharge  2022 by Milton Barboza RN  Outcome: Completed  2022 by Milton Barboza RN  Outcome: Progressing  Flowsheets (Taken 2022)  Absence of infection at discharge:   Monitor all insertion sites i.e., indwelling lines, tubes and drains   Administer medications as ordered   Instruct and encourage patient and family to use good hand hygiene technique   Assess and monitor for signs and symptoms of infection  Goal: Absence of infection during hospitalization  2022 by Milton Barboza RN  Outcome: Completed  2022 by Milton Barboza RN  Outcome: Progressing  Flowsheets (Taken 2022)  Absence of infection during hospitalization: Assess and monitor for signs and symptoms of infection     Problem: Safety - Adult  Goal: Free from fall injury  6/20/2022 1336 by Terell Church RN  Outcome: Completed  6/20/2022 0831 by Terell Church RN  Outcome: Progressing  Flowsheets (Taken 6/20/2022 0831)  Free From Fall Injury: Based on caregiver fall risk screen, instruct family/caregiver to ask for assistance with transferring infant if caregiver noted to have fall risk factors     Problem: Discharge Planning  Goal: Discharge to home or other facility with appropriate resources  6/20/2022 1336 by Terell Church RN  Outcome: Completed  6/20/2022 0831 by Terell Church RN  Outcome: Progressing  Flowsheets (Taken 6/20/2022 0831)  Discharge to home or other facility with appropriate resources: Arrange for interpreters to assist at discharge as needed   verbalize understanding of the plan of care and contribute to goal setting.

## 2022-06-20 NOTE — PLAN OF CARE
Problem: Pain  Goal: Verbalizes/displays adequate comfort level or baseline comfort level  Outcome: Progressing  Flowsheets (Taken 2022)  Verbalizes/displays adequate comfort level or baseline comfort level:   Encourage patient to monitor pain and request assistance   Assess pain using appropriate pain scale   Administer analgesics based on type and severity of pain and evaluate response     Problem: Vaginal Birth or  Section  Goal: Fetal and maternal status remain reassuring during the birth process  Description:  Birth OB-Pregnancy care plan goal which identifies if the fetal and maternal status remain reassuring during the birth process  Outcome: Progressing  Flowsheets (Taken 2022)  Fetal and Maternal Status Remain Reassuring During the Birth Process:   Monitor vital signs   Monitor uterine activity   Monitor fetal heart rate   Monitor labor progression (Vaginal delivery)     Problem: Infection - Adult  Goal: Absence of infection at discharge  Outcome: Progressing  Flowsheets (Taken 2022)  Absence of infection at discharge:   Monitor all insertion sites i.e., indwelling lines, tubes and drains   Administer medications as ordered   Instruct and encourage patient and family to use good hand hygiene technique   Assess and monitor for signs and symptoms of infection  Goal: Absence of infection during hospitalization  Outcome: Progressing  Flowsheets (Taken 2022)  Absence of infection during hospitalization: Assess and monitor for signs and symptoms of infection     Problem: Safety - Adult  Goal: Free from fall injury  Outcome: Progressing  Flowsheets (Taken 2022)  Free From Fall Injury: Based on caregiver fall risk screen, instruct family/caregiver to ask for assistance with transferring infant if caregiver noted to have fall risk factors     Problem: Discharge Planning  Goal: Discharge to home or other facility with appropriate resources  Outcome: Progressing  Flowsheets (Taken 6/20/2022 3250)  Discharge to home or other facility with appropriate resources: Arrange for interpreters to assist at discharge as needed     Care plan reviewed with patient and her spouse Mackenzie Amin. Patient and her spouse Mackenzie Amin verbalize understanding of the plan of care and contribute to goal setting.

## 2022-06-20 NOTE — ANESTHESIA PROCEDURE NOTES
Epidural Block    Patient location during procedure: OB  Start time: 6/20/2022 10:25 AM  End time: 6/20/2022 10:45 AM  Reason for block: labor epidural  Staffing  Performed: anesthesiologist and resident/CRNA   Anesthesiologist: Lauryn Kim DO  Resident/CRNA: ZACH Guadalupe CRNA  Epidural  Patient position: sitting  Prep: ChloraPrep  Patient monitoring: continuous pulse ox and frequent blood pressure checks  Approach: midline  Location: L4-5  Injection technique: BRITTANY saline  Guidance: paresthesia technique  Provider prep: mask and sterile gloves  Needle  Needle type: Tuohy   Needle gauge: 18 G  Needle length: 3.5 in  Needle insertion depth: 7 cm  Catheter type: side hole  Catheter size: 20 G  Catheter at skin depth: 11 cm  Test dose: negativeCatheter Secured: tegaderm  Assessment  Hemodynamics: stable  Attempts: 1  Preanesthetic Checklist  Completed: patient identified, IV checked, site marked, risks and benefits discussed, surgical/procedural consents, equipment checked, pre-op evaluation, timeout performed, anesthesia consent given, oxygen available, monitors applied/VS acknowledged, fire risk safety assessment completed and verbalized and blood product R/B/A discussed and consented

## 2022-06-20 NOTE — FLOWSHEET NOTE
Pt presents to [facility] for induction of labor at 38+5 weeks. Pt denies any vaginal bleeding or leaking of fluids, fetal movement noted per pt. Pt oriented to room and call system. Patient to bathroom to void, informed of maternal drug testing policy in place on all laboring patients. Verbal consent received, paper consent to be signed and urine to be sent.

## 2022-06-20 NOTE — FLOWSHEET NOTE
Up to the bathroom voided quantity sufficient, pericare per pt. dermaplast and tucks used clean pad put in place. Gown changed. Pt to wheelchair and taken to 5A14 in stable condition with  daughter in arms.  Report to Ria Galarza RN at 3183

## 2022-06-20 NOTE — PROGRESS NOTES
Pt admitted to 20-23-41-52, assessed and oriented to room, call light, menu and \"ducks in a row\", plan of care discussed with pt, pt voices understanding, pt knows to call when needs to void,  Pt has no other needs at this time.

## 2022-06-21 VITALS
WEIGHT: 165 LBS | TEMPERATURE: 97.7 F | OXYGEN SATURATION: 98 % | RESPIRATION RATE: 16 BRPM | BODY MASS INDEX: 28.17 KG/M2 | HEART RATE: 60 BPM | HEIGHT: 64 IN | SYSTOLIC BLOOD PRESSURE: 122 MMHG | DIASTOLIC BLOOD PRESSURE: 84 MMHG

## 2022-06-21 PROCEDURE — 6370000000 HC RX 637 (ALT 250 FOR IP): Performed by: OBSTETRICS & GYNECOLOGY

## 2022-06-21 RX ADMIN — DOCUSATE SODIUM 100 MG: 100 CAPSULE, LIQUID FILLED ORAL at 08:46

## 2022-06-21 RX ADMIN — IBUPROFEN 800 MG: 800 TABLET, FILM COATED ORAL at 00:34

## 2022-06-21 RX ADMIN — IBUPROFEN 800 MG: 800 TABLET, FILM COATED ORAL at 08:46

## 2022-06-21 ASSESSMENT — PAIN - FUNCTIONAL ASSESSMENT
PAIN_FUNCTIONAL_ASSESSMENT: ACTIVITIES ARE NOT PREVENTED
PAIN_FUNCTIONAL_ASSESSMENT: ACTIVITIES ARE NOT PREVENTED

## 2022-06-21 ASSESSMENT — PAIN DESCRIPTION - DESCRIPTORS
DESCRIPTORS: CRAMPING
DESCRIPTORS: SORE

## 2022-06-21 ASSESSMENT — PAIN SCALES - GENERAL
PAINLEVEL_OUTOF10: 4
PAINLEVEL_OUTOF10: 5

## 2022-06-21 ASSESSMENT — PAIN DESCRIPTION - ORIENTATION: ORIENTATION: LOWER

## 2022-06-21 ASSESSMENT — PAIN DESCRIPTION - LOCATION
LOCATION: PERINEUM
LOCATION: ABDOMEN

## 2022-06-21 NOTE — PLAN OF CARE
Problem: Postpartum  Goal: Experiences normal postpartum course  Description:  Postpartum OB-Pregnancy care plan goal which identifies if the mother is experiencing a normal postpartum course  6/20/2022 2223 by Kevyn Lowery RN  Outcome: Progressing  Flowsheets (Taken 6/20/2022 2030)  Experiences Normal Postpartum Course: Monitor maternal vital signs     Problem: Pain  Goal: Verbalizes/displays adequate comfort level or baseline comfort level  6/20/2022 2223 by Kevyn Lowery RN  Outcome: Progressing  Flowsheets (Taken 6/20/2022 2030)  Verbalizes/displays adequate comfort level or baseline comfort level:   Encourage patient to monitor pain and request assistance   Assess pain using appropriate pain scale   Administer analgesics based on type and severity of pain and evaluate response     Problem: Infection - Adult  Goal: Absence of infection at discharge  6/20/2022 2223 by Kevyn Lowery RN  Outcome: Progressing  Flowsheets (Taken 6/20/2022 2030)  Absence of infection at discharge:   Assess and monitor for signs and symptoms of infection   Monitor lab/diagnostic results     Problem: Safety - Adult  Goal: Free from fall injury  6/20/2022 2223 by Kevyn Lowery RN  Outcome: Progressing  Flowsheets (Taken 6/20/2022 2030)  Free From Fall Injury: Instruct family/caregiver on patient safety     Problem: Discharge Planning  Goal: Discharge to home or other facility with appropriate resources  6/20/2022 2223 by Kevyn Lowery RN  Outcome: Progressing  Flowsheets (Taken 6/20/2022 2030)  Discharge to home or other facility with appropriate resources: Identify barriers to discharge with patient and caregiver     Plan of care discussed with mother and she contributes to goal setting and voices understanding of plan of care.

## 2022-06-21 NOTE — ANESTHESIA POSTPROCEDURE EVALUATION
Department of Anesthesiology  Postprocedure Note    Patient: Kusum Flood  MRN: [id number]  YOB: 1985  Date of evaluation: 6/21/2022      Procedure Summary     Date: 06/20/22 Room / Location:     Anesthesia Start: 1025 Anesthesia Stop: 0740    Procedure: Labor Analgesia Diagnosis:     Scheduled Providers:  Responsible Provider: Sury Gonzalez DO    Anesthesia Type: epidural ASA Status: 2          Anesthesia Type: No value filed.     Seven Phase I: Seven Score: 9    Seven Phase II: Seven Score: 10    Last vitals:   See nurses chart      Anesthesia Post Evaluation    Patient location during evaluation: floor  Patient participation: complete - patient participated  Level of consciousness: awake and alert  Airway patency: patent  Nausea & Vomiting: no nausea and no vomiting  Complications: no  Cardiovascular status: hemodynamically stable  Respiratory status: acceptable and spontaneous ventilation  Hydration status: euvolemic

## 2022-06-21 NOTE — DISCHARGE SUMMARY
Obstetric Discharge Summary      Pt Name: Estefania Nunn  MRN: [id number] [place] #: [de-identified]  YOB: 1985        Admitting Diagnosis  IUP  OB History        6    Para   3    Term   3            AB   3    Living   2       SAB   3    IAB        Ectopic        Molar        Multiple   0    Live Births   2                Reasons for Admission on 2022  7:30 AM  Encounter for elective induction of labor [Z34.90]  No comment available  Vaginal Delivery      Intrapartum Procedures        Multiple birth?: No                 Postpartum/Operative Complications       [place] Data  Information for the patient's :  German Islas [[id number]]   female   Birth Weight: 7 lb 3.3 oz (3.27 kg)       Discharge Diagnosis       Discharge Information  Current Discharge Medication List      CONTINUE these medications which have NOT CHANGED    Details   aspirin 81 MG chewable tablet Take 81 mg by mouth daily      docusate sodium (COLACE) 100 MG capsule Take 100 mg by mouth as needed for Constipation      diphenhydrAMINE-APAP, sleep, (TYLENOL PM EXTRA STRENGTH PO) Take 1 tablet by mouth as needed      Prenatal Multivit-Min-Fe-FA (PRE- FORMULA PO) Take 2 tablets by mouth daily              No discharge procedures on file. Vaginal Delivery  Diet regular  Condition Good    Discharge to:  home  Follow up in 5-6 wks.   Dominic Lindquist MD 2022 8:41 AM

## 2022-06-21 NOTE — PROGRESS NOTES
Postpartum education brochure given, teaching complete. [facility] postpartum depression screening discussed with patient. [facility] weeks and contact her healthcare provider if her score is > 10. Patient voiced understanding. Reviewed postpartum birth warning signs flyer with patient. Patient has voiced understanding of teaching. Mother's blood type is o positive. Baby's blood type is a positive. Mother did not receive Rhogam .    Infant has roomed in with mother this shift. Benefits of rooming in discussed.

## 2022-06-21 NOTE — LACTATION NOTE
Called to room to assist with latch. Infant fussy at breast. Pt STS with infant. Pt hand expressing and cup feeding infant colostrum. Pt sates that she plans to pump and bottle feed when she gets home. Pt states no other questions at this time. Encouraged Pt to call with any questions or to set up an outpatient appointment as needed. Yenny Dowd

## 2022-06-21 NOTE — PROGRESS NOTES
Department of Obstetrics and Gynecology  Labor and Delivery  Attending Post Partum Progress Note    PPD #1    SUBJECTIVE: Feeling well    OBJECTIVE:     Vitals:  /78   Pulse 62   Temp 98.2 °F (36.8 °C) (Oral)   Resp 14   Ht 5' 4\" (1.626 m)   Wt 165 lb (74.8 kg)   SpO2 98%   Breastfeeding Unknown   BMI 28.32 kg/m²     Uterus:  normal size, well involuted, firm, non-tender    DATA:     No results found for this or any previous visit (from the past 24 hour(s)). ASSESSMENT & PLAN:  Doing well. Plan discharge on day 2.     Electronically signed by Letha Paulino MD on 6/21/2022 at 8:40 AM

## 2024-01-08 ENCOUNTER — HOSPITAL ENCOUNTER (EMERGENCY)
Age: 39
Discharge: HOME | End: 2024-01-08
Payer: COMMERCIAL

## 2024-01-08 VITALS
DIASTOLIC BLOOD PRESSURE: 80 MMHG | HEIGHT: 64 IN | WEIGHT: 130 LBS | BODY MASS INDEX: 22.2 KG/M2 | SYSTOLIC BLOOD PRESSURE: 130 MMHG | OXYGEN SATURATION: 100 % | RESPIRATION RATE: 18 BRPM | HEART RATE: 83 BPM | TEMPERATURE: 98.3 F

## 2024-01-08 DIAGNOSIS — J01.40 ACUTE PANSINUSITIS, RECURRENCE NOT SPECIFIED: Primary | ICD-10-CM

## 2024-01-08 PROCEDURE — 99213 OFFICE O/P EST LOW 20 MIN: CPT | Performed by: NURSE PRACTITIONER

## 2024-01-08 PROCEDURE — 99213 OFFICE O/P EST LOW 20 MIN: CPT

## 2024-01-08 RX ORDER — AMOXICILLIN AND CLAVULANATE POTASSIUM 875; 125 MG/1; MG/1
1 TABLET, FILM COATED ORAL 2 TIMES DAILY
Qty: 20 TABLET | Refills: 0 | Status: SHIPPED | OUTPATIENT
Start: 2024-01-08 | End: 2024-01-18

## 2024-01-08 ASSESSMENT — ENCOUNTER SYMPTOMS
SINUS PAIN: 1
SINUS PRESSURE: 1
TROUBLE SWALLOWING: 0
SHORTNESS OF BREATH: 0
RHINORRHEA: 0
EYE REDNESS: 0
SORE THROAT: 0
DIARRHEA: 0
VOMITING: 0
EYE DISCHARGE: 0
NAUSEA: 0
COUGH: 1

## 2024-01-08 ASSESSMENT — PAIN - FUNCTIONAL ASSESSMENT: PAIN_FUNCTIONAL_ASSESSMENT: NONE - DENIES PAIN

## 2024-01-08 NOTE — DISCHARGE INSTRUCTIONS
Take all medications as prescribed.  Treat the symptoms by making sure you are drinking fluids and you are well-rested. You may take Tylenol or Motrin per package instructions, unless otherwise directed. Seek emergency medical treatment for fever >101.5 for 3 days, unable to eat or urinate for 6 hours, increase in current symptoms or for new or worrisome symptoms.

## 2024-01-08 NOTE — ED NOTES
Pt presents to  by self with complaints of nasal drainage, cough, head pressure as well as ear ain x2-3 weeks.     Valerie Lopez, RN  01/08/24 2535

## 2024-01-08 NOTE — ED PROVIDER NOTES
[facility] URGENT CARE  Urgent Care Encounter      CHIEF COMPLAINT       Chief Complaint   Patient presents with    Sinusitis       Nurses Notes reviewed and I agree except as noted in the HPI.  HISTORY OF PRESENT ILLNESS   Janel Rodriguez is a 38 y.o. female who presents with a 1 day history of sinus pressure and pain, headaches, left ear pain, and a very mild cough.  Patient states she has had cough for approximately 4 weeks and this is improving.  She has been taking Sudafed and Mucinex DM.  Patient states that she is eating and drinking well and is denying fever.  She has been in contact with multiple sick coworkers.  She is denying chance of pregnancy at today's visit.    REVIEW OF SYSTEMS     Review of Systems   Constitutional:  Negative for chills, diaphoresis, fatigue and fever.   HENT:  Positive for congestion, ear pain, sinus pressure and sinus pain. Negative for rhinorrhea, sore throat and trouble swallowing.    Eyes:  Negative for discharge and redness.   Respiratory:  Positive for cough. Negative for shortness of breath.    Cardiovascular:  Negative for chest pain.   Gastrointestinal:  Negative for diarrhea, nausea and vomiting.   Genitourinary:  Negative for decreased urine volume.   Musculoskeletal:  Negative for neck pain and neck stiffness.   Skin:  Negative for rash.   Neurological:  Positive for headaches.   Hematological:  Negative for adenopathy.   Psychiatric/Behavioral:  Negative for sleep disturbance.        PAST MEDICAL HISTORY         Diagnosis Date    Abnormal Pap smear of cervix     age 20    Rh incompatibility     Rhogam given in 2015       SURGICAL HISTORY     Patient  has a past surgical history that includes other surgical history (4/15/2013); Appendectomy; Arm Surgery; Dilation and curettage of uterus (2011); and Dilation and curettage of uterus (N/A, 3/4/2019).    CURRENT MEDICATIONS       Previous Medications    ASPIRIN 81 MG CHEWABLE TABLET    Take 81 mg by mouth  [address]  [facility] [id number]  In 1 week  If symptoms worsen, [phone]    DISCHARGE MEDICATIONS:  New Prescriptions    AMOXICILLIN-CLAVULANATE (AUGMENTIN) 875-125 MG PER TABLET    Take 1 tablet by mouth 2 times daily for 10 days     Current Discharge Medication List      Please note that some or all of this chart was generated using [employer] voice recognition software. Although every effort was made to ensure the accuracy of this automated transcription, some errors in transcription may have occurred      ZACH Wilson - Karen Barry, APRN - CNP  01/08/24 8047     General

## 2025-06-30 ENCOUNTER — LAB (OUTPATIENT)
Dept: LAB | Age: 40
End: 2025-06-30

## 2025-07-09 LAB — CYTOLOGY THIN PREP PAP: NORMAL

## (undated) DEVICE — GOWN,SIRUS,NON REINFRCD,LARGE,SET IN SL: Brand: MEDLINE

## (undated) DEVICE — Z DISCONTINUED USE 2635398 CANNULA VAC DIA9MM CRV SEMI RIG W/ ROUNDED TIP TAPR END

## (undated) DEVICE — TOWEL,OR,DSP,ST,BLUE,DLX,4/PK,20PK/CS: Brand: MEDLINE

## (undated) DEVICE — PAD,SANITARY,11 IN,MAXI,W/WINGS,N-STRL: Brand: MEDLINE

## (undated) DEVICE — SET COLL TBNG L6FT DIA3/8IN W/ INTEGR SWVL HNDL SLIP RNG M

## (undated) DEVICE — Z DISCONTINUED BY MEDLINE USE 2711682 TRAY SKIN PREP DRY W/ PREM GLV

## (undated) DEVICE — PAD,NON-ADHERENT,3X8,STERILE,LF,1/PK: Brand: MEDLINE

## (undated) DEVICE — SURE SET SINGLE BASIN-LF: Brand: MEDLINE INDUSTRIES, INC.

## (undated) DEVICE — GLOVE ORANGE PI 7   MSG9070

## (undated) DEVICE — SOLUTION SCRB 4OZ 4% CHG H2O AIDED FOR PREOPERATIVE SKIN

## (undated) DEVICE — SYSTEM COLL W/ TISS TRAP INCLUDE COLL CANSTR LID SET OF

## (undated) DEVICE — GOWN,SIRUS,NONRNF,SETINSLV,XL,20/CS: Brand: MEDLINE

## (undated) DEVICE — GLOVE ORANGE PI 7 1/2   MSG9075

## (undated) DEVICE — GAUZE,SPONGE,8"X4",12PLY,XRAY,STRL,LF: Brand: MEDLINE

## (undated) DEVICE — PACK,SET UP,NO DRAPES: Brand: MEDLINE